# Patient Record
Sex: FEMALE | Race: WHITE | Employment: OTHER | URBAN - METROPOLITAN AREA
[De-identification: names, ages, dates, MRNs, and addresses within clinical notes are randomized per-mention and may not be internally consistent; named-entity substitution may affect disease eponyms.]

---

## 2018-05-26 ENCOUNTER — APPOINTMENT (OUTPATIENT)
Dept: GENERAL RADIOLOGY | Age: 63
End: 2018-05-26
Attending: EMERGENCY MEDICINE
Payer: COMMERCIAL

## 2018-05-26 ENCOUNTER — HOSPITAL ENCOUNTER (EMERGENCY)
Age: 63
Discharge: HOME | End: 2018-05-26
Attending: EMERGENCY MEDICINE | Admitting: EMERGENCY MEDICINE
Payer: COMMERCIAL

## 2018-05-26 VITALS
OXYGEN SATURATION: 97 % | SYSTOLIC BLOOD PRESSURE: 146 MMHG | TEMPERATURE: 97.7 F | HEIGHT: 63 IN | WEIGHT: 160.94 LBS | RESPIRATION RATE: 12 BRPM | HEART RATE: 72 BPM | DIASTOLIC BLOOD PRESSURE: 83 MMHG | BODY MASS INDEX: 28.52 KG/M2

## 2018-05-26 DIAGNOSIS — S51.031A PUNCTURE WOUND OF RIGHT ELBOW, INITIAL ENCOUNTER: Primary | ICD-10-CM

## 2018-05-26 DIAGNOSIS — L03.113 CELLULITIS OF RIGHT UPPER EXTREMITY: ICD-10-CM

## 2018-05-26 PROCEDURE — 73080 X-RAY EXAM OF ELBOW: CPT

## 2018-05-26 PROCEDURE — 99282 EMERGENCY DEPT VISIT SF MDM: CPT

## 2018-05-26 RX ORDER — DOXYCYCLINE HYCLATE 100 MG
100 TABLET ORAL 2 TIMES DAILY
Qty: 20 TAB | Refills: 0 | Status: SHIPPED | OUTPATIENT
Start: 2018-05-26 | End: 2018-06-05

## 2018-05-26 NOTE — DISCHARGE INSTRUCTIONS
Cellulitis: Care Instructions  Your Care Instructions    Cellulitis is a skin infection. It often occurs after a break in the skin from a scrape, cut, bite, or puncture, or after a rash. The doctor has checked you carefully, but problems can develop later. If you notice any problems or new symptoms, get medical treatment right away. Follow-up care is a key part of your treatment and safety. Be sure to make and go to all appointments, and call your doctor if you are having problems. It's also a good idea to know your test results and keep a list of the medicines you take. How can you care for yourself at home? · Take your antibiotics as directed. Do not stop taking them just because you feel better. You need to take the full course of antibiotics. · Prop up the infected area on pillows to reduce pain and swelling. Try to keep the area above the level of your heart as often as you can. · If your doctor told you how to care for your wound, follow your doctor's instructions. If you did not get instructions, follow this general advice:  ¨ Wash the wound with clean water 2 times a day. Don't use hydrogen peroxide or alcohol, which can slow healing. ¨ You may cover the wound with a thin layer of petroleum jelly, such as Vaseline, and a nonstick bandage. ¨ Apply more petroleum jelly and replace the bandage as needed. · Be safe with medicines. Take pain medicines exactly as directed. ¨ If the doctor gave you a prescription medicine for pain, take it as prescribed. ¨ If you are not taking a prescription pain medicine, ask your doctor if you can take an over-the-counter medicine. To prevent cellulitis in the future  · Try to prevent cuts, scrapes, or other injuries to your skin. Cellulitis most often occurs where there is a break in the skin. · If you get a scrape, cut, mild burn, or bite, wash the wound with clean water as soon as you can to help avoid infection.  Don't use hydrogen peroxide or alcohol, which can slow healing. · If you have swelling in your legs (edema), support stockings and good skin care may help prevent leg sores and cellulitis. · Take care of your feet, especially if you have diabetes or other conditions that increase the risk of infection. Wear shoes and socks. Do not go barefoot. If you have athlete's foot or other skin problems on your feet, talk to your doctor about how to treat them. When should you call for help? Call your doctor now or seek immediate medical care if:  ? · You have signs that your infection is getting worse, such as:  ¨ Increased pain, swelling, warmth, or redness. ¨ Red streaks leading from the area. ¨ Pus draining from the area. ¨ A fever. ? · You get a rash. ? Watch closely for changes in your health, and be sure to contact your doctor if:  ? · You are not getting better after 1 day (24 hours). ? · You do not get better as expected. Where can you learn more? Go to http://delgado-karlos.info/. Vivienne Balderrama in the search box to learn more about \"Cellulitis: Care Instructions. \"  Current as of: October 13, 2016  Content Version: 11.4  © 7865-5447 [employer]. Care instructions adapted under license by [employer] (which disclaims liability or warranty for this information). If you have questions about a medical condition or this instruction, always ask your healthcare professional. [place] [address] any warranty or liability for your use of this information. Puncture Wounds: Care Instructions  Your Care Instructions    A puncture wound can happen anywhere on your body. These wounds tend to be narrower and deeper than cuts. A puncture wound is usually left open instead of being closed. This is because a puncture wound can be easily infected, and closing it can make infection even more likely. You will probably have a bandage over the wound.   The doctor has checked you carefully, but problems can develop later. If you notice any problems or new symptoms, get medical treatment right away. Follow-up care is a key part of your treatment and safety. Be sure to make and go to all appointments, and call your doctor if you are having problems. It's also a good idea to know your test results and keep a list of the medicines you take. How can you care for yourself at home? · Keep the wound dry for the first 24 to 48 hours. After this, you can shower if your doctor okays it. Pat the wound dry. · Don't soak the wound, such as in a bathtub. Your doctor will tell you when it's safe to get the wound wet. · If your doctor told you how to care for your wound, follow your doctor's instructions. If you did not get instructions, follow this general advice:  ¨ After the first 24 to 48 hours, wash the wound with clean water 2 times a day. Don't use hydrogen peroxide or alcohol, which can slow healing. ¨ You may cover the wound with a thin layer of petroleum jelly, such as Vaseline, and a nonstick bandage. ¨ Apply more petroleum jelly and replace the bandage as needed. · Prop up the sore area on pillows anytime you sit or lie down during the next 3 days. Try to keep it above the level of your heart. This helps reduce swelling. · Avoid any activity that could cause your wound to get worse. · Be safe with medicines. Read and follow all instructions on the label. ¨ If the doctor gave you a prescription medicine for pain, take it as prescribed. ¨ If you are not taking a prescription pain medicine, ask your doctor if you can take an over-the-counter medicine. · If your doctor prescribed antibiotics, take them as directed. Do not stop taking them just because you feel better. You need to take the full course of antibiotics. When should you call for help? Call your doctor now or seek immediate medical care if:  ? · You have new pain, or your pain gets worse.    ? · The wound starts to bleed, and blood soaks through the bandage. Oozing small amounts of blood is normal.   ? · The skin near the wound is cold or pale or changes color. ? · You have tingling, weakness, or numbness near the wound. ? · You have trouble moving the area near the wound. ? · You have symptoms of infection, such as:  ¨ Increased pain, swelling, warmth, or redness around the wound. ¨ Red streaks leading from the wound. ¨ Pus draining from the wound. ¨ A fever. ? Watch closely for changes in your health, and be sure to contact your doctor if:  ? · The wound is not closing (getting smaller). ? · You do not get better as expected. Where can you learn more? Go to http://delgado-karlos.info/. Enter Y958 in the search box to learn more about \"Puncture Wounds: Care Instructions. \"  Current as of: March 20, 2017  Content Version: 11.4  © 7222-0251 [employer]. Care instructions adapted under license by [employer] (which disclaims liability or warranty for this information). If you have questions about a medical condition or this instruction, always ask your healthcare professional. [place] [address] any warranty or liability for your use of this information. WET TO DRY DRESSINGS EACH DAY    FOLLOW UP WITH [facility], I DISCUSSED WITH DR. Jake Richard WHO RECOMMENDED FOLLOW UP WITH DR. MICHAELS

## 2018-05-26 NOTE — ED PROVIDER NOTES
EMERGENCY DEPARTMENT HISTORY AND PHYSICAL EXAM      Date: 5/26/2018  Patient Name: Sirena Pulliam    History of Presenting Illness     Chief Complaint   Patient presents with    Elbow Pain     Ambulatory into the ED with c/o Rt elbow injury x 1 week-sent to the ED d/t abnormal xray; per pt, \"there is an air pocket. \" WBC is WNL. Denies fevers/chills. History Provided By: Patient    HPI: Sirena Pulliam, 60 y.o. female presents ambulatory to the ED from [facility] with cc of acute mild right elbow swelling after falling down her porch steps 1 week ago. Pt reports entering the ED after a friend told her the elbow looked infected. Pt endorses being able to flex and extend the arm without difficulty. She endorses being able to lift her grandchildren and denies any pain. She notes the imaging performed at [facility] indicated an air pocket at the right elbow. Pt denies a PMhx significant for DM. She reports having her tetanus updated today. Pt also reports mild left hip pain after the fall that was previously worked up without evidence of fracture or other complications. Pt denies any EtOH, or illegal drug use, however endorses tobacco use. Pt denies any fever, n/v/d, or other new symptoms at this time. Chief Complaint: Right elbow injury  Duration: 1 week   Timing:  Acute  Location: Right elbow  Quality: Swelling  Severity: mild  Modifying Factors: N/A  Associated Symptoms: denies any other associated signs or symptoms    There are no other complaints, changes, or physical findings at this time. PCP: Zac Harris MD    Past History     Past Medical History:  No past medical history on file. Past Surgical History:  No past surgical history on file. Family History:  No family history on file.     Social History:  Social History   Substance Use Topics    Smoking status: Not on file    Smokeless tobacco: Not on file    Alcohol use Not on file       Allergies:  No Known Allergies      Review of Systems   Review of Systems   Constitutional: Negative. Negative for appetite change, chills, fatigue and fever. HENT: Negative. Negative for congestion, rhinorrhea, sinus pressure and sore throat. Eyes: Negative. Respiratory: Negative. Negative for cough, choking, chest tightness, shortness of breath and wheezing. Cardiovascular: Negative. Negative for chest pain, palpitations and leg swelling. Gastrointestinal: Negative for abdominal pain, constipation, diarrhea, nausea and vomiting. Endocrine: Negative. Genitourinary: Negative. Negative for difficulty urinating, dysuria, flank pain and urgency. Musculoskeletal: Negative. (+) right elbow swelling   Skin: Negative. Neurological: Negative. Negative for dizziness, speech difficulty, weakness, light-headedness, numbness and headaches. Psychiatric/Behavioral: Negative. All other systems reviewed and are negative. Physical Exam   Physical Exam   Constitutional: She is oriented to person, place, and time. She appears well-developed and well-nourished. No distress. HENT:   Head: Normocephalic and atraumatic. Mouth/Throat: Oropharynx is clear and moist. No oropharyngeal exudate. Eyes: Conjunctivae and EOM are normal. Pupils are equal, round, and reactive to light. Neck: Normal range of motion. Neck supple. No JVD present. No tracheal deviation present. Cardiovascular: Normal rate, regular rhythm, normal heart sounds and intact distal pulses. No murmur heard. Pulmonary/Chest: Effort normal and breath sounds normal. No stridor. No respiratory distress. She has no wheezes. She has no rales. She exhibits no tenderness. Musculoskeletal: Normal range of motion. She exhibits no edema.    Nickel size open wound over the right olecranon, no crepitus appreciated, minimal serous drainage but no exudate, mild erythema and warmth extending in cricular pattern approx 1\" in width, Right shoulder, right elbow and hand with full range of motion without any pain    Neurological: She is alert and oriented to person, place, and time. No cranial nerve deficit. No gross motor or sensory deficits    Skin: Skin is warm and dry. She is not diaphoretic. Psychiatric: She has a normal mood and affect. Her behavior is normal.   Nursing note and vitals reviewed. Diagnostic Study Results     Radiologic Studies -   XR ELBOW RT MIN 3 V   Final Result   EXAM:  XR ELBOW RT MIN 3 V     INDICATION:   Elbow pain, puncture wound of olecranon.     COMPARISON: None.     FINDINGS: Three views of the right elbow demonstrate no fracture or  malalignment. No bony destruction. No effusion. Large soft tissue defect  overlying the olecranon     IMPRESSION  IMPRESSION:      Large soft tissue defect dorsally with no fracture or bone destruction. Medical Decision Making   I am the first provider for this patient. I reviewed the vital signs, available nursing notes, past medical history, past surgical history, family history and social history. Vital Signs-Reviewed the patient's vital signs. Patient Vitals for the past 12 hrs:   Temp Pulse Resp BP SpO2   05/26/18 1143 97.7 °F (36.5 °C) 72 12 146/83 97 %       Records Reviewed: Nursing Notes, Old Medical Records, Previous electrocardiograms, Ambulance Run Sheet, Previous Radiology Studies and Previous Laboratory Studies    Provider Notes (Medical Decision Making):   DDx: cellulitis, puncture wound, elbow fracture    ED Course:   11:57 AM  Initial assessment performed. The patients presenting problems have been discussed, and they are in agreement with the care plan formulated and outlined with them. I have encouraged them to ask questions as they arise throughout their visit. 12:47 PM  Ricky Texted orthopedics regarding elbow XR.     CONSULT NOTE:  1:02 PM  Afshan Quevedo DO spoke with Radha Serrano MD via [facility] Text  Specialty: Orthopedics  Discussed patient's hx, disposition, and available diagnostic and imaging results. Reviewed care plans. Consultant agrees with plans as outlined. Dr. Refugio Palmer recommends oral antibiotics with orthopedic follow up, and apply wet to dry dressing. Procedure Note - Wound cleanse:  1:13 PM  Procedure by [employer] Corporation[employer]NAE. Wound to left elbow. No expressible drainage. No surrounding erythema, edema or ecchymosis. Wound copiously irrigated under saline lavage with Hibiclens and saline solution. Wet dressing applied with gauze and ACE wrap overlying. Procedure took 10 minutes and patient tolerated the procedure well. Disposition:  DISCHARGE NOTE:  1:24 PM  The patient has been re-evaluated and is ready for discharge. Reviewed available results with patient. Counseled patient on diagnosis and care plan. Patient has expressed understanding, and all questions have been answered. Patient agrees with plan and agrees to follow up as recommended, or return to the ED if their symptoms worsen. Discharge instructions have been provided and explained to the patient, along with reasons to return to the ED. PLAN:  1. Current Discharge Medication List      START taking these medications    Details   doxycycline (VIBRA-TABS) 100 mg tablet Take 1 Tab by mouth two (2) times a day for 10 days. Qty: 20 Tab, Refills: 0           2. Follow-up Information     Follow up With Details Comments [address] [place] [address]  [facility] [address]  [address]  [phone]      [facility] EMERGENCY DEPT  If symptoms worsen [address] [facility] [address]   [address] [address] [phone]        Return to ED if worse     Diagnosis     Clinical Impression:   1. Puncture wound of right elbow, initial encounter    2.  Cellulitis of right upper extremity        Attestations:    ATTESTATION:  This note is prepared by Flaquita Tirado, acting as Scribe for Brielle Tillman Paul Rene DO: The scribe's documentation has been prepared under my direction and personally reviewed by me in its entirety. I confirm that the note above accurately reflects all work, treatment, procedures, and medical decision making performed by me.

## 2018-05-26 NOTE — ED NOTES
Discharge instructions reviewed with patient, copy given by Dr. Chelsea Harry. Pt is accomponied by family, denies use of wheelchair.

## 2018-05-26 NOTE — ED NOTES
Assumed care of patient. Patient is alert and oriented, does not appear to be in distress. Patient ambulatory to ED with c/o possible infection in right elbow wound.  Sent by [facility].

## 2019-07-08 RX ORDER — CITALOPRAM 20 MG/1
30 TABLET, FILM COATED ORAL DAILY
COMMUNITY

## 2019-07-08 RX ORDER — LORATADINE 10 MG/1
10 TABLET ORAL DAILY
COMMUNITY

## 2019-07-08 RX ORDER — BISMUTH SUBSALICYLATE 262 MG
1 TABLET,CHEWABLE ORAL DAILY
COMMUNITY

## 2019-07-08 RX ORDER — MELOXICAM 15 MG/1
15 TABLET ORAL DAILY
Status: ON HOLD | COMMUNITY
End: 2019-11-05

## 2019-07-08 NOTE — PERIOP NOTES
[facility]  Ambulatory Surgery Unit  Pre-operative Instructions    Procedure Date  7/16            Tentative Arrival Time 3:30pm      1. On the day of your procedure, please report to the Ambulatory Surgery Unit Registration Desk and sign in at your designated time. The Ambulatory Surgery Unit is located in [facility] on the [facility] side of the [facility] across from the [address] [place] [address]. Please have all of your health insurance cards and a photo ID. 2. You must have someone with you to drive you home as directed by your surgeon. 3. You may have a light breakfast and take normal morning medications. 4. We recommend you do not drink any alcoholic beverages for 24 hours before and after your procedure. 5. Contact your surgeons office for instructions on the following medications: non-steroidal anti-inflammatory drugs (i.e. Advil, Aleve), vitamins, and supplements. (Some surgeons will want you to stop these medications prior to surgery and others may allow you to take them)   **If you are currently taking Plavix, Coumadin, Aspirin and/or other blood-thinning agents, contact your surgeon for instructions. ** Your surgeon will partner with the physician prescribing these medications to determine if it is safe to stop or if you need to continue taking. Please do not stop taking these medications without instructions from your surgeon. 6. In an effort to help prevent surgical site infection, we ask that you shower with an anti-bacterial soap (i.e. Dial or Safeguard) on the morning of your procedure. Do not apply any lotions, powders, or deodorants after showering. 7. Wear comfortable clothes. Wear glasses instead of contacts. Do not bring any jewelry or money (other than copays or fees as instructed). Do not wear make-up, particularly mascara, the morning of your procedure. Wear your hair loose or down, no ponytails, buns, chelsea pins or clips.  All body piercings must be removed. 8. You should understand that if you do not follow these instructions your procedure may be cancelled. If your physical condition changes (i.e. fever, cold or flu) please contact your surgeon as soon as possible. 9. It is important that you be on time. If a situation occurs where you may be late, or if you have any questions or problems, please call [phone].    10. Your procedure time may be subject to change. You will receive a phone call the day prior to confirm your arrival time. I understand a pre-operative phone call will be made to verify my procedure time. In the event that I am not available, I give permission for a message to be left on my answering service and/or with another person?       yes    Reviewed by phone with pt, verbalized understanding     ___________________      ___________________      ___________________  (Signature of Patient)          (Witness)                   (Date and Time)

## 2019-07-16 ENCOUNTER — HOSPITAL ENCOUNTER (OUTPATIENT)
Age: 64
Setting detail: OUTPATIENT SURGERY
Discharge: HOME | End: 2019-07-16
Attending: PHYSICAL MEDICINE & REHABILITATION | Admitting: PHYSICAL MEDICINE & REHABILITATION
Payer: MEDICARE

## 2019-07-16 ENCOUNTER — APPOINTMENT (OUTPATIENT)
Dept: GENERAL RADIOLOGY | Age: 64
End: 2019-07-16
Attending: PHYSICAL MEDICINE & REHABILITATION
Payer: MEDICARE

## 2019-07-16 VITALS
WEIGHT: 148 LBS | BODY MASS INDEX: 27.23 KG/M2 | HEART RATE: 66 BPM | HEIGHT: 62 IN | RESPIRATION RATE: 16 BRPM | TEMPERATURE: 98 F | OXYGEN SATURATION: 99 % | DIASTOLIC BLOOD PRESSURE: 97 MMHG | SYSTOLIC BLOOD PRESSURE: 116 MMHG

## 2019-07-16 PROCEDURE — 76210000046 HC AMBSU PH II REC FIRST 0.5 HR: Performed by: PHYSICAL MEDICINE & REHABILITATION

## 2019-07-16 PROCEDURE — 74011000250 HC RX REV CODE- 250: Performed by: PHYSICAL MEDICINE & REHABILITATION

## 2019-07-16 PROCEDURE — 74011250636 HC RX REV CODE- 250/636: Performed by: PHYSICAL MEDICINE & REHABILITATION

## 2019-07-16 PROCEDURE — 77030003666 HC NDL SPINAL BD -A: Performed by: PHYSICAL MEDICINE & REHABILITATION

## 2019-07-16 PROCEDURE — 72020 X-RAY EXAM OF SPINE 1 VIEW: CPT

## 2019-07-16 PROCEDURE — 76030000002 HC AMB SURG OR TIME FIRST 0.: Performed by: PHYSICAL MEDICINE & REHABILITATION

## 2019-07-16 PROCEDURE — 76000 FLUOROSCOPY <1 HR PHYS/QHP: CPT

## 2019-07-16 RX ORDER — METHYLPREDNISOLONE ACETATE 40 MG/ML
40 INJECTION, SUSPENSION INTRA-ARTICULAR; INTRALESIONAL; INTRAMUSCULAR; SOFT TISSUE ONCE
Status: COMPLETED | OUTPATIENT
Start: 2019-07-16 | End: 2019-07-16

## 2019-07-16 RX ORDER — LIDOCAINE HYDROCHLORIDE 20 MG/ML
8 INJECTION, SOLUTION INFILTRATION; PERINEURAL ONCE
Status: COMPLETED | OUTPATIENT
Start: 2019-07-16 | End: 2019-07-16

## 2019-07-16 RX ORDER — BUPIVACAINE HYDROCHLORIDE 5 MG/ML
5 INJECTION, SOLUTION EPIDURAL; INTRACAUDAL ONCE
Status: COMPLETED | OUTPATIENT
Start: 2019-07-16 | End: 2019-07-16

## 2019-07-16 NOTE — H&P
Procedural Case Note    7/16/2019    (4:02 PM)    Olman Bolanos    1955   (71 y.o.)    [id number]    CC:  pain    ROS:   Complete ROS obtained, no CP, no SOB, no N or V    PMH:     Past Medical History:   Diagnosis Date    Anxiety     Arthritis     H/O seasonal allergies        ALLERGIES:   No Known Allergies    MEDS:     Current Facility-Administered Medications   Medication Dose Route Frequency    bupivacaine (PF) (MARCAINE) 0.5 % (5 mg/mL) injection 25 mg  5 mL Epidural ONCE    methylPREDNISolone acetate (DEPO-MEDROL) 40 mg/mL injection 40 mg  40 mg IntraMUSCular ONCE    lidocaine (XYLOCAINE) 20 mg/mL (2 %) injection 160 mg  8 mL IntraDERMal ONCE    iohexol (OMNIPAQUE) 180 mg iodine/mL injection 10 mL  10 mL Intra artICUlar ONCE    sodium bicarbonate (4%) (NEUT) injection 1 mL  1 mL SubCUTAneous ONCE          Visit Vitals  /65 (BP 1 Location: Right arm, BP Patient Position: At rest)   Pulse 73   Temp 98 °F (36.7 °C)   Resp 14   Ht 5' 2\" (1.575 m)   Wt 67.1 kg (148 lb)   SpO2 96%   BMI 27.07 kg/m²     PE:  Gen: NAD  Head: normocephalic  Heart: RRR  Lungs: CTA montse  Abd: NT, ND, soft  Neuro: awake and alert  Skin: intact    IMPRESSION:   Lumbar DJD    Note:  The clinical status was discussed in detail with the patient. The procedure was again discussed and described in detail. All understand and accept the planned procedure and risks; reject other forms of treatment. All questions are answered.     Arnulfo Michael MD

## 2019-07-16 NOTE — PERIOP NOTES
Michael Black  1955  [id number]    Situation:  Verbal report given from: ELSIE Roy RN  Procedure: Procedure(s):  BILATERAL L4-5 AND L5-S1 FACET INJECTIONS    Background:    Preoperative diagnosis: LUMBAR SPONDYLOSIS    Postoperative diagnosis: LUMBAR SPONDYLOSIS    :  Dr. Alan Europe:  Intra-procedure medications, procedure, and allergies reviewed        Recommendation:    Discharge patient home after discharge instructions reviewed with patient. Rest until local has worn off.

## 2019-07-16 NOTE — DISCHARGE INSTRUCTIONS
Discharge Instructions    Lumbar Facet Block/Medial Branch Block    You had a lumbar facet injection today. You will probably have some numbness in your lower back area for the next 6-8 hours. The steroids will slowly become effective, reducing your pain, over the next 2 weeks. You should begin feeling better after a few days, but it may take up to 2 weeks to notice the difference. The benefit you get from your injection will last a variable amount of time, depending on the severity of your spine problem. If the results you experience are significant, but not lasting a long time, you may be a candidate for a procedure that can be longer lasting (radiofrequency ablation of the nerves innervating the facet joints).  Pain:  Most people do not have any increase in pain after this injection. However, you might experience some soreness at the site of the injection. If this happens, putting an icepack over the sore area will help.  Bandage: You have a small bandage covering the site of the injection. You may remove it when you get home.  Restrictions:  Someone should drive you home after the injection. After that, you have no restrictions. You may resume your normal level of activity. You may take a shower or bath, and you may eat normally. You should continue your current exercised and/or therapy routine.  Medications:  Continue your current medications as prescribed. If your pain decreases, you may reduce the amount of your pain medicines. If you stopped taking anticoagulants or blood-thinners before the injection, start them tomorrow. If you have diabetes, your blood sugar may be elevated for a few days. Call your primary doctor with any questions.     Call Dr. Amilcar Rodriguez at [phone] if you experience:   Fever (101 degrees Fahrenheit or greater)   Nausea or vomiting   Headache unrelieved by your normal pain medicine   Redness or swelling at the injection site that lasts more than 1 day   New numbness, tingling, weakness, or pain that you didnt have before the injection     If still having pain in 1-2 weeks, call office at [phone] for a follow up appointment. DISCHARGE SUMMARY from Nurse    The following personal items collected during your admission are returned to you:   Dental Appliance: Dental Appliances: None  Vision: Visual Aid: None  Hearing Aid:    Jewelry: Jewelry: None  Clothing: Clothing: With patient  Other Valuables:    Valuables sent to safe: If you were given prescriptions, please review the written information on prescribed medications. · You will receive a Post Operative Call from one of the Recovery Room Nurses on the day after your surgery to check on you. It is very important for us to know how you are recovering after your surgery. · You may receive an e-mail or letter in the mail from [employer] regarding your experience with us in the Ambulatory Surgery Unit. Your feedback is valuable to us and we appreciate your participation in the survey. If you have not had your influenza or pneumococcal vaccines, please follow up with your primary care physician. The discharge information has been reviewed with the patient. The patient verbalized understanding.

## 2019-07-16 NOTE — OP NOTES
Facet Steroid Injection Operative Report    Indications: This is a 71 y.o. female who presents with LBP. She was positive for LS DJD. The patient was admitted for surgery as conservative measures have failed. Date of Surgery: 7/16/2019    Preoperative Diagnosis: LS DJD    Postoperative Diagnosis: LS DJD    Surgeon(s) and Role:     * Tera Meckel, MD - Primary     Procedure:  Procedure(s):  BILATERAL L4-5 AND L5-S1 FACET INJECTIONS    Procedure in Detail:  After appropriate informed consent was obtained, the patient was taken to the operating suite and placed in the prone position on the operating table on appropriate padding. The LS region was prepped and draped in the usual sterile fashion. Intraoperative fluoroscopy was used to localize the LS spine. The skin was infiltrated with 2% lidocaine. An 22-g needle was advanced into the Paco L4-5 and L5-S1 facets under fluoroscopic guidance. Next, 2ml of 0.5% marcaine and 80mg of Depo-Medrol were injected. The needle was removed from the patient. The patient was then turned back into the supine position on the stretcher and was taken to the Recovery Room in stable condition.     Estimated Blood Loss:  none     Specimens: None       Drains: None          Complications:  None    Signed By: Daphne Tobin MD                        July 16, 2019

## 2019-09-05 ENCOUNTER — HOSPITAL ENCOUNTER (OUTPATIENT)
Dept: MRI IMAGING | Age: 64
Discharge: HOME | End: 2019-09-05
Attending: ORTHOPAEDIC SURGERY
Payer: MEDICARE

## 2019-09-05 DIAGNOSIS — M54.5 LOW BACK PAIN, UNSPECIFIED BACK PAIN LATERALITY, UNSPECIFIED CHRONICITY, WITH SCIATICA PRESENCE UNSPECIFIED: ICD-10-CM

## 2019-09-05 DIAGNOSIS — M47.816 LUMBAR SPONDYLOSIS: ICD-10-CM

## 2019-09-05 PROCEDURE — 72148 MRI LUMBAR SPINE W/O DYE: CPT

## 2019-11-04 RX ORDER — DICLOFENAC SODIUM 75 MG/1
75 TABLET, DELAYED RELEASE ORAL 2 TIMES DAILY
COMMUNITY

## 2019-11-04 RX ORDER — GABAPENTIN 300 MG/1
300 CAPSULE ORAL 3 TIMES DAILY
COMMUNITY

## 2019-11-05 ENCOUNTER — APPOINTMENT (OUTPATIENT)
Dept: GENERAL RADIOLOGY | Age: 64
End: 2019-11-05
Attending: PHYSICAL MEDICINE & REHABILITATION
Payer: MEDICARE

## 2019-11-05 ENCOUNTER — HOSPITAL ENCOUNTER (OUTPATIENT)
Age: 64
Setting detail: OUTPATIENT SURGERY
Discharge: HOME | End: 2019-11-05
Attending: PHYSICAL MEDICINE & REHABILITATION | Admitting: PHYSICAL MEDICINE & REHABILITATION
Payer: MEDICARE

## 2019-11-05 VITALS
HEART RATE: 74 BPM | BODY MASS INDEX: 28.71 KG/M2 | HEIGHT: 62 IN | DIASTOLIC BLOOD PRESSURE: 77 MMHG | WEIGHT: 156 LBS | RESPIRATION RATE: 18 BRPM | OXYGEN SATURATION: 96 % | SYSTOLIC BLOOD PRESSURE: 134 MMHG | TEMPERATURE: 98.1 F

## 2019-11-05 PROCEDURE — 77030003666 HC NDL SPINAL BD -A: Performed by: PHYSICAL MEDICINE & REHABILITATION

## 2019-11-05 PROCEDURE — 74011636320 HC RX REV CODE- 636/320: Performed by: PHYSICAL MEDICINE & REHABILITATION

## 2019-11-05 PROCEDURE — 76000 FLUOROSCOPY <1 HR PHYS/QHP: CPT

## 2019-11-05 PROCEDURE — 74011000250 HC RX REV CODE- 250: Performed by: PHYSICAL MEDICINE & REHABILITATION

## 2019-11-05 PROCEDURE — 76210000046 HC AMBSU PH II REC FIRST 0.5 HR: Performed by: PHYSICAL MEDICINE & REHABILITATION

## 2019-11-05 PROCEDURE — 74011250636 HC RX REV CODE- 250/636: Performed by: PHYSICAL MEDICINE & REHABILITATION

## 2019-11-05 PROCEDURE — 76030000002 HC AMB SURG OR TIME FIRST 0.: Performed by: PHYSICAL MEDICINE & REHABILITATION

## 2019-11-05 PROCEDURE — 72100 X-RAY EXAM L-S SPINE 2/3 VWS: CPT

## 2019-11-05 RX ORDER — BUPIVACAINE HYDROCHLORIDE 5 MG/ML
10 INJECTION, SOLUTION EPIDURAL; INTRACAUDAL ONCE
Status: DISCONTINUED | OUTPATIENT
Start: 2019-11-05 | End: 2019-11-05 | Stop reason: HOSPADM

## 2019-11-05 RX ORDER — LIDOCAINE HYDROCHLORIDE 20 MG/ML
7 INJECTION, SOLUTION INFILTRATION; PERINEURAL ONCE
Status: COMPLETED | OUTPATIENT
Start: 2019-11-05 | End: 2019-11-05

## 2019-11-05 RX ORDER — METHYLPREDNISOLONE ACETATE 40 MG/ML
40 INJECTION, SUSPENSION INTRA-ARTICULAR; INTRALESIONAL; INTRAMUSCULAR; SOFT TISSUE ONCE
Status: COMPLETED | OUTPATIENT
Start: 2019-11-05 | End: 2019-11-05

## 2019-11-05 RX ORDER — LORAZEPAM 1 MG/1
1 TABLET ORAL
COMMUNITY

## 2019-11-05 NOTE — PERIOP NOTES
Skin assessment:   WNL   Skin color: normal for etnicity   Skin condition: intact   Skin integrity: WNL- pt denies any cuts or sores   Turgor: normal for age    Neuro:  Push/Pull assessment:     LUE Response: strong    LLE Response: strong push/ pull   RUE Response: strong    RLE Response: strong push / pull    OB/Gyn assessment:    LMP: menopausal     If no menstrual period then reason: menopausal

## 2019-11-05 NOTE — H&P
Procedural Case Note    11/5/2019    (1:17 PM)    Kristy Lu    1955   (71 y.o.)    [id number]    CC:  pain    ROS:   Complete ROS obtained, no CP, no SOB, no N or V    PMH:     Past Medical History:   Diagnosis Date    Anxiety     Arthritis     H/O seasonal allergies        ALLERGIES:   No Known Allergies    MEDS:     Current Facility-Administered Medications   Medication Dose Route Frequency    methylPREDNISolone acetate (DEPO-MEDROL) 40 mg/mL injection 40 mg  40 mg IntraMUSCular ONCE    bupivacaine (PF) (MARCAINE) 0.5 % (5 mg/mL) injection 50 mg  10 mL Epidural ONCE    lidocaine (XYLOCAINE) 20 mg/mL (2 %) injection 140 mg  7 mL SubCUTAneous ONCE    iohexol (OMNIPAQUE) 180 mg iodine/mL injection 10 mL  10 mL Intra artICUlar ONCE    sodium bicarbonate (4%) (NEUT) injection 1 mL  1 mL SubCUTAneous ONCE          Visit Vitals  /71 (BP 1 Location: Right arm, BP Patient Position: At rest)   Pulse 68   Temp 98 °F (36.7 °C)   Resp 18   Ht 5' 2\" (1.575 m)   Wt 70.8 kg (156 lb)   SpO2 96%   BMI 28.53 kg/m²     PE:  Gen: NAD  Head: normocephalic  Heart: RRR  Lungs: CTA montse  Abd: NT, ND, soft  Neuro: awake and alert  Skin: intact    IMPRESSION:   LS DDD    Note:  The clinical status was discussed in detail with the patient. The procedure was again discussed and described in detail. All understand and accept the planned procedure and risks; reject other forms of treatment. All questions are answered.     Luciana Barry MD

## 2019-11-05 NOTE — PERIOP NOTES
Pt has strong and equal plantar and dorsi flexion upon assessment    1422-Pt able to stand and hold weight, R leg weaker upon standing. No swelling or bleeding at injection sites. Transported via w/c to awaiting transportation.

## 2019-11-05 NOTE — OP NOTES
Epidural Steroid Injection Operative Report    Indications: This is a 71 y.o. female who presents with low back pain. She was positive for LS DDD. The patient was admitted for surgery as conservative measures have failed. Date of Surgery: 11/5/2019    Preoperative Diagnosis: LS DDD    Postoperative Diagnosis: LS DDD    Surgeon(s) and Role:     * Anthony Magana MD - Primary     Procedure:  Right L4 and L5 TFESI    Procedure in Detail:  After appropriate informed consent was obtained, the patient was taken to the operating suite and placed in the prone position on the operating table on appropriate padding. The LS region was prepped and draped in the usual sterile fashion. Intraoperative fluoroscopy was used to localize the LS spine. The skin was infiltrated with 2% lidocaine. An 22-g needle was advanced into the Right L4 and L5 neuroforamen under fluoroscopic guidance. A small amount of contrast was injected into the epidural space, confirming appropriate needle placement on fluoroscopy. Next, 2ml of 2% lidocaine and 80mg of Depo-Medrol were injected. The needle was removed from the patient. The patient was then turned back into the supine position on the stretcher and was taken to the Recovery Room in stable condition.     Estimated Blood Loss:  none     Specimens: None       Drains: None          Complications:  None    Signed By: Sherie Singh MD                        November 5, 2019

## 2019-11-05 NOTE — PERIOP NOTES
[facility]  1955  [id number]    Situation:  Verbal report given from: ELSIE Roy RN  Procedure: Procedure(s):  RIGHT AND LEFT L4 AND L5 TFESI    Background:    Preoperative diagnosis: DDD (degenerative disc disease), lumbar [M51.36]    Postoperative diagnosis: DDD (degenerative disc disease), lumbar [M51.36]    :  Dr. Zaman Solid:  Intra-procedure medications, procedure, and allergies reviewed        Recommendation:    Discharge patient home after discharge instructions reviewed with patient. Rest until local has worn off.

## 2019-11-05 NOTE — DISCHARGE INSTRUCTIONS
Dr. Ashley Brown Discharge Instructions  Transforaminal Epidural Steroid Injection/ Selective Nerve Block    You had a transforaminal epidural steroid injection/ selective nerve block today. You will probably have some numbness, and possibly weakness, in your leg for the next 6 to 8 hours. The steroids will slowly become effective, reducing your pain, over the next 2 weeks. You should begin feeling better after a few days, but it may take up to 2 weeks to notice the difference. The benefit you get from your injection will last a variable amount of time, depending on the severity of your lumbar spine problem.  Pain: Most people do not have any increase in pain after this injection. However, you might experience some soreness in your low back. If this happens, putting an ice pack over the sore area will help.  Bandage: You will have a small bandage covering the site of the injection. You may remove it once you get home.  Restrictions: Someone should drive you home after the injection. After that, you have no restrictions. You need to be careful while walking, as you may still have some numbness or weakness in your leg. You may resume your normal level of activity. You may take a shower or bath, and you may eat normally. You should continue your current exercises and/or therapy routine.   Medications: Continue your current medications as prescribed. If your pain decreases, you may reduce the amount of your pain medicines. If you stopped taking anticoagulants or blood-thinners before the injection, start them tomorrow. If you have diabetes, your blood sugar may be elevated for a few days. Call your primary doctor with any questions.   Call Dr. Ashley Brown at [phone] if you experience:   Fever (101 degrees Fahrenheit or greater)   Nausea or vomiting   Headache unrelieved by your normal pain medicine   Redness or swelling at the injection site that lasts more than 1 day   New numbness, tingling, weakness, or pain that you didnt have before the injection    Follow-up appointment:   If still having pain in 1-2 weeks, call office at [phone] for a follow up appointment. DISCHARGE SUMMARY from Nurse    The following personal items collected during your admission are returned to you:   Dental Appliance:    Vision:    Hearing Aid:    Jewelry:    Clothing:    Other Valuables:    Valuables sent to safe: If you were given prescriptions, please review the written information on prescribed medications. · You will receive a Post Operative Call from one of the Recovery Room Nurses on the day after your surgery to check on you. It is very important for us to know how you are recovering after your surgery. · You may receive an e-mail or letter in the mail from [facility] regarding your experience with us in the Ambulatory Surgery Unit. Your feedback is valuable to us and we appreciate your participation in the survey. If you have not had your influenza or pneumococcal vaccines, please follow up with your primary care physician. The discharge information has been reviewed with the patient. The patient verbalized understanding.

## 2020-02-04 ENCOUNTER — HOSPITAL ENCOUNTER (OUTPATIENT)
Dept: MRI IMAGING | Age: 65
Discharge: HOME | End: 2020-02-04
Attending: PHYSICAL MEDICINE & REHABILITATION
Payer: MEDICARE

## 2020-02-04 DIAGNOSIS — M50.30 DDD (DEGENERATIVE DISC DISEASE), CERVICAL: ICD-10-CM

## 2020-02-04 PROCEDURE — 72141 MRI NECK SPINE W/O DYE: CPT

## 2020-02-25 ENCOUNTER — APPOINTMENT (OUTPATIENT)
Dept: GENERAL RADIOLOGY | Age: 65
End: 2020-02-25
Attending: PHYSICAL MEDICINE & REHABILITATION
Payer: MEDICARE

## 2020-02-25 ENCOUNTER — HOSPITAL ENCOUNTER (OUTPATIENT)
Age: 65
Setting detail: OUTPATIENT SURGERY
Discharge: HOME | End: 2020-02-25
Attending: PHYSICAL MEDICINE & REHABILITATION | Admitting: PHYSICAL MEDICINE & REHABILITATION
Payer: MEDICARE

## 2020-02-25 VITALS
OXYGEN SATURATION: 99 % | SYSTOLIC BLOOD PRESSURE: 134 MMHG | DIASTOLIC BLOOD PRESSURE: 84 MMHG | WEIGHT: 153 LBS | BODY MASS INDEX: 27.11 KG/M2 | HEIGHT: 63 IN | TEMPERATURE: 98.4 F | HEART RATE: 66 BPM | RESPIRATION RATE: 18 BRPM

## 2020-02-25 PROCEDURE — 77030003665 HC NDL SPN BBMI -A: Performed by: PHYSICAL MEDICINE & REHABILITATION

## 2020-02-25 PROCEDURE — 72020 X-RAY EXAM OF SPINE 1 VIEW: CPT

## 2020-02-25 PROCEDURE — 74011000250 HC RX REV CODE- 250: Performed by: PHYSICAL MEDICINE & REHABILITATION

## 2020-02-25 PROCEDURE — 74011250636 HC RX REV CODE- 250/636: Performed by: PHYSICAL MEDICINE & REHABILITATION

## 2020-02-25 PROCEDURE — 76000 FLUOROSCOPY <1 HR PHYS/QHP: CPT

## 2020-02-25 PROCEDURE — 76210000046 HC AMBSU PH II REC FIRST 0.5 HR: Performed by: PHYSICAL MEDICINE & REHABILITATION

## 2020-02-25 PROCEDURE — 76030000002 HC AMB SURG OR TIME FIRST 0.: Performed by: PHYSICAL MEDICINE & REHABILITATION

## 2020-02-25 RX ORDER — LIDOCAINE HYDROCHLORIDE 20 MG/ML
5 INJECTION, SOLUTION INFILTRATION; PERINEURAL ONCE
Status: COMPLETED | OUTPATIENT
Start: 2020-02-25 | End: 2020-02-25

## 2020-02-25 RX ORDER — BUPIVACAINE HYDROCHLORIDE 5 MG/ML
5 INJECTION, SOLUTION EPIDURAL; INTRACAUDAL ONCE
Status: COMPLETED | OUTPATIENT
Start: 2020-02-25 | End: 2020-02-25

## 2020-02-25 RX ORDER — DEXAMETHASONE SODIUM PHOSPHATE 4 MG/ML
8 INJECTION, SOLUTION INTRA-ARTICULAR; INTRALESIONAL; INTRAMUSCULAR; INTRAVENOUS; SOFT TISSUE ONCE
Status: COMPLETED | OUTPATIENT
Start: 2020-02-25 | End: 2020-02-25

## 2020-02-25 RX ORDER — METHYLPREDNISOLONE ACETATE 40 MG/ML
40 INJECTION, SUSPENSION INTRA-ARTICULAR; INTRALESIONAL; INTRAMUSCULAR; SOFT TISSUE ONCE
Status: DISCONTINUED | OUTPATIENT
Start: 2020-02-25 | End: 2020-02-25 | Stop reason: HOSPADM

## 2020-02-25 NOTE — H&P
Procedural Case Note    2/25/2020    (1:30 PM)    Tori Perez    1955   (71 y.o.)    [id number]    CC:  pain    ROS:   Complete ROS obtained, no CP, no SOB, no N or V    PMH:     Past Medical History:   Diagnosis Date    Anxiety     Arthritis     H/O seasonal allergies        ALLERGIES:   No Known Allergies    MEDS:     No current facility-administered medications for this encounter. There were no vitals taken for this visit. PE:  Gen: NAD  Head: normocephalic  Heart: RRR  Lungs: CTA montse  Abd: NT, ND, soft  Neuro: awake and alert  Skin: intact    IMPRESSION:   Cervical DJD    Note:  The clinical status was discussed in detail with the patient. The procedure was again discussed and described in detail. All understand and accept the planned procedure and risks; reject other forms of treatment. All questions are answered.     Annie Parkinson MD

## 2020-02-25 NOTE — DISCHARGE INSTRUCTIONS
Discharge Instructions  Cervical Medial Branch Block  You had a cervical facet injection today. You will probably have some numbness in your neck and/or arms for the next 6 to 8 hours. The steroids will slowly become effective, reducing your pain, over the next 2 weeks. You should begin feeling better after a few days, but it may take up to 2 weeks to notice the difference. The benefit you get from your injection will last a variable amount of time, depending on the severity of your cervical spine problem. If the results you experience are significant, but not long lasting, you may be a candidate for a procedure that can be longer lasting (radiofrequency ablation of the nerves innervating the facet joints).  Pain:  Most people do not have any increase in pain after this injection. However, you might experience some soreness at the site of the injection. If this happens, putting an ice pack over the sore area will help.  Bandage: You have a small bandage covering the site of the injection. You may remove it once you get home.  Restrictions:  Someone should drive you home after the injection. After that, you have no restrictions. You may resume your normal level of activity. You may take a shower or bath, and you may eat normally. You should continue your current exercises and/or therapy routine.  Medications:  Continue your current medications as prescribed. If your pain decreases, you may reduce the amount of your pain medicines. If you stopped taking anticoagulants or blood-thinners before the injection, start them tomorrow. If you have diabetes, your blood sugar may be elevated for a few days. Call your primary doctor with any questions.   Call Dr. Curt Gallardo at [phone]  if you experience:   Fever (101 degrees Fahrenheit or greater)   Nausea or vomiting   Headache unrelieved by your normal pain medicine   Redness or swelling at the injection site that lasts more than 1 day   New numbness, tingling, weakness, or pain that you didnt have before the injection. If still having pain in 1-2 weeks, call office at [phone] for a follow up appointment. DISCHARGE SUMMARY from Nurse    The following personal items collected during your admission are returned to you:   Dental Appliance: Dental Appliances: None  Vision:    Hearing Aid:    Jewelry: Jewelry: None  Clothing: Clothing: Shirt, With patient(bra and shirt under stretcher)  Other Valuables: Other Valuables: None  Valuables sent to safe: If you were given prescriptions, please review the written information on prescribed medications. · You will receive a Post Operative Call from one of the Recovery Room Nurses on the day after your surgery to check on you. It is very important for us to know how you are recovering after your surgery. · You may receive an e-mail or letter in the mail from [employer] regarding your experience with us in the Ambulatory Surgery Unit. Your feedback is valuable to us and we appreciate your participation in the survey. If you have not had your influenza or pneumococcal vaccines, please follow up with your primary care physician. The discharge information has been reviewed with the patient. The patient verbalized understanding.

## 2020-02-25 NOTE — PERIOP NOTES
Patient with strong bilateral hand . Denies any pain or discomfort. Injection site without drainage. Pt discharged via wheelchair, accompanied by RN. Pt discharged awake and alert, respirations equal and unlabored, skin warm, dry, and intact. Pt and family members' questions and concerns addressed prior to discharge.

## 2020-02-25 NOTE — OP NOTES
Cervical Facet Steroid Injection Operative Report    Indications: This is a 71 y.o. female who presents with cervicalgia. She was positive for cervical DJD. The patient was admitted for surgery as conservative measures have failed. Date of Surgery: 2/25/2020    Preoperative Diagnosis: cervical DJD    Postoperative Diagnosis: cervical DJD    Surgeon(s) and Role:     * Christoph Hoffman MD - Primary     Procedure:  Procedure(s):  BILATERAL C4-5, C5-6 FACET INJECTIONS    Procedure in Detail:  After appropriate informed consent was obtained, the patient was taken to the operating suite and placed in the prone position on the operating table on appropriate padding. The cervical region was prepped and draped in the usual sterile fashion. Intraoperative fluoroscopy was used to localize the cervical spine. The skin was infiltrated with 2% lidocaine. An 25-g needle was advanced into the Paco C4-5 and C5-6 facets under fluoroscopic guidance. Next, 2ml of 0.5% marcaine and 8 mg of Dexamethasone were injected. The needle was removed from the patient. The patient was then turned back into the supine position on the stretcher and was taken to the Recovery Room in stable condition.     Estimated Blood Loss:  none     Specimens: None       Drains: None          Complications:  None    Signed By: Dayanara White MD                        February 25, 2020

## 2020-02-25 NOTE — PERIOP NOTES
Rhianna Charles  1955  [id number]    Situation:  Verbal report given from: florencio Zaldivar RN    Procedure: Procedure(s):  BILATERAL C4-5, C5-6 FACET INJECTIONS    Background:    Preoperative diagnosis: DDD (degenerative disc disease), lumbar [M51.36]    Postoperative diagnosis: DDD (degenerative disc disease), lumbar [M51.36]    :  Dr. Siobhan Rand:  Intra-procedure medications, procedure, and allergies reviewed        Recommendation:    Discharge patient home after discharge instructions reviewed with patient. Rest until local has worn off.

## 2020-02-25 NOTE — PERIOP NOTES
Permission received to review discharge instructions and discuss private health information with daughter Kari Vázquez

## 2020-03-12 NOTE — PERIOP NOTES
[facility]  Ambulatory Surgery Unit  Pre-operative Instructions    Procedure Date  03/18            Tentative Arrival Time 1015      1. On the day of your procedure, please report to the Ambulatory Surgery Unit Registration Desk and sign in at your designated time. The Ambulatory Surgery Unit is located in [facility] on the [facility] side of the [facility] across from the [facility]. Please have all of your health insurance cards and a photo ID. 2. You must have someone with you to drive you home as directed by your surgeon. 3. You may have a light breakfast and take normal morning medications. 4. We recommend you do not drink any alcoholic beverages for 24 hours before and after your procedure. 5. Contact your surgeons office for instructions on the following medications: non-steroidal anti-inflammatory drugs (i.e. Advil, Aleve), vitamins, and supplements. (Some surgeons will want you to stop these medications prior to surgery and others may allow you to take them)   **If you are currently taking Plavix, Coumadin, Aspirin and/or other blood-thinning agents, contact your surgeon for instructions. ** Your surgeon will partner with the physician prescribing these medications to determine if it is safe to stop or if you need to continue taking. Please do not stop taking these medications without instructions from your surgeon. 6. In an effort to help prevent surgical site infection, we ask that you shower with an anti-bacterial soap (i.e. Dial or Safeguard) on the morning of your procedure. Do not apply any lotions, powders, or deodorants after showering. 7. Wear comfortable clothes. Wear glasses instead of contacts. Do not bring any jewelry or money (other than copays or fees as instructed). Do not wear make-up, particularly mascara, the morning of your procedure. Wear your hair loose or down, no ponytails, buns, chelsea pins or clips.  All body piercings must be removed. 8. You should understand that if you do not follow these instructions your procedure may be cancelled. If your physical condition changes (i.e. fever, cold or flu) please contact your surgeon as soon as possible. 9. It is important that you be on time. If a situation occurs where you may be late, or if you have any questions or problems, please call [phone].    10. Your procedure time may be subject to change. You will receive a phone call the day prior to confirm your arrival time. As a precaution [facility] has implemented a restricted visitation policy to limit to 1 visitor per patient. No visitors under the age of 15 will be permitted in the Hospital. If you are ill, please call to reschedule your procedure. (For pediatric patients, 2 visitors per patient). I understand a pre-operative phone call will be made to verify my procedure time. In the event that I am not available, I give permission for a message to be left on my answering service and/or with another person?       yes         ___________________      ___________________      ___________________  (Signature of Patient)          (Witness)                   (Date and Time)

## 2020-03-16 NOTE — H&P
CC:  Skin issue lesion right foot       History of present illness: Patient had a nonpigmented viable lesion approximately 7 mm between the first and second digits greater than 10 years but mostly most recently over last few months just won't close. No erythema, no purulent discharge, no pigmentation. 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 Again no erythema, no signs of infection, no indurated tissue, no clinical signs of malignancy seen right foot. We are recommending taking a wider opening flap in order to close this area using underlying Vicryl stitch and closed with a nylon stitch. The risks and possible complications, including infection, and failure to close the gap, were discussed as possible risks and complications. She understands and is fully agreeable to the procedure. Medications: Claritin, citalopram, biotin, collagen, hydr (bovine) (bulk), diclofenac sodium, multivitamin, gabapentin, mupirocin  Allergies: Patient has no known allergies. Medical History: Acute arthritis (disorder) , History of multiple allergies (situation)       Surgical History: Appendectomy, Orthopedic, Unkn, Web Toes. Family History: Natural Father  - Bilateral cataracts (disorder) , Blood clot (morphologic abnormality) , Acute arthritis (disorder) ,Natural Mother  - Ischemic stroke (disorder) , Heart disease (disorder)        Social History: Alcohol -Drinking described as Occasionally. Professional -Retired. Smoker -Current Smoker, 1 pack per day. General/constitutional: No malaise, no transient myalgia or arthralgia, patient afebrile. No loss of strength, no recent weight loss or weight gain unexplained. HEENT: No headaches, dizziness. No vision loss as of late, no neck stiffness, no swelling along the lymphatic channels.     Cardiovascular: No chest pain, no dyspnea on exertion, no palpitations. Respiratory: No wheezing, no shortness of breath, no unexplained fever or night sweats. GI: No abdominal pain, nausea, no vomiting. : No oliguria, polyuria or hematuria. Orthopedic: No lower back pain or stiffness, no radiation from the hip. No increase or new upper extremity weakness. ROS: No malaise, no shortness of breath, no posterior calf pain noted bilateral      Lower extremity examination:      Integument: Warm, supple, normal turgor. No pigmented lesion seen. No significant dryness distal digits bilaterally. As stated above right foot. Vascular: Palpable pedal pulses bilaterally, no loss of hair growth noted to the distal digits bilaterally. Capillary refill time less than 3 seconds to digits 1-5 bilaterally. No swelling or induration along the lymphatic channels. Neurological: Grossly intact bilaterally    Musculoskeletal: 5/5 muscle strength of all muscle groups of the foot and ankle bilaterally. Calf girth within normal limits and symmetrical.     Assessment: Non-closing noninfected appearing wound right foot. Treatment plan: As stated above, patient is failing basic conservative and clinical treatments, were opting for wide excisional approach with flap creation using Vicryl internally at the subcuticular level and external nylon to close this area. The risks and possible complications were discussed with the patient thoroughly. She is fully agreeable to the surgical procedure.       Winthrop Mortimer, DPM, [address] 2054  Board Certified, American Board of Foot and [id number]

## 2020-03-18 ENCOUNTER — HOSPITAL ENCOUNTER (OUTPATIENT)
Age: 65
Setting detail: OUTPATIENT SURGERY
Discharge: HOME | End: 2020-03-18
Attending: PODIATRIST | Admitting: PODIATRIST
Payer: MEDICARE

## 2020-03-18 VITALS
TEMPERATURE: 97.8 F | BODY MASS INDEX: 27.46 KG/M2 | SYSTOLIC BLOOD PRESSURE: 131 MMHG | OXYGEN SATURATION: 95 % | RESPIRATION RATE: 17 BRPM | HEIGHT: 63 IN | WEIGHT: 155 LBS | HEART RATE: 75 BPM | DIASTOLIC BLOOD PRESSURE: 70 MMHG

## 2020-03-18 PROCEDURE — 74011250637 HC RX REV CODE- 250/637: Performed by: PODIATRIST

## 2020-03-18 PROCEDURE — 77030031139 HC SUT VCRL2 J&J -A: Performed by: PODIATRIST

## 2020-03-18 PROCEDURE — 74011250636 HC RX REV CODE- 250/636: Performed by: PODIATRIST

## 2020-03-18 PROCEDURE — 77030018836 HC SOL IRR NACL ICUM -A: Performed by: PODIATRIST

## 2020-03-18 PROCEDURE — 77030002916 HC SUT ETHLN J&J -A: Performed by: PODIATRIST

## 2020-03-18 PROCEDURE — 76030000000 HC AMB SURG OR TIME 0.5 TO 1: Performed by: PODIATRIST

## 2020-03-18 PROCEDURE — 76210000046 HC AMBSU PH II REC FIRST 0.5 HR: Performed by: PODIATRIST

## 2020-03-18 PROCEDURE — 74011000250 HC RX REV CODE- 250: Performed by: PODIATRIST

## 2020-03-18 RX ORDER — WATER FOR INJECTION,STERILE
VIAL (ML) INJECTION
Status: DISCONTINUED
Start: 2020-03-18 | End: 2020-03-18 | Stop reason: HOSPADM

## 2020-03-18 RX ORDER — ACETAMINOPHEN 325 MG/1
650 TABLET ORAL
Status: CANCELLED | OUTPATIENT
Start: 2020-03-18

## 2020-03-18 RX ORDER — BUPIVACAINE HYDROCHLORIDE AND EPINEPHRINE 5; 5 MG/ML; UG/ML
INJECTION, SOLUTION EPIDURAL; INTRACAUDAL; PERINEURAL AS NEEDED
Status: DISCONTINUED | OUTPATIENT
Start: 2020-03-18 | End: 2020-03-18 | Stop reason: HOSPADM

## 2020-03-18 RX ORDER — SODIUM CHLORIDE 9 MG/ML
INJECTION, SOLUTION INTRAVENOUS
Status: COMPLETED | OUTPATIENT
Start: 2020-03-18 | End: 2020-03-18

## 2020-03-18 RX ORDER — CEFAZOLIN SODIUM 1 G/3ML
INJECTION, POWDER, FOR SOLUTION INTRAMUSCULAR; INTRAVENOUS
Status: DISCONTINUED
Start: 2020-03-18 | End: 2020-03-18 | Stop reason: HOSPADM

## 2020-03-18 RX ADMIN — WATER 2 G: 1 INJECTION INTRAMUSCULAR; INTRAVENOUS; SUBCUTANEOUS at 13:00

## 2020-03-18 RX ADMIN — Medication 3 AMPULE: at 12:21

## 2020-03-18 NOTE — H&P
NO CHANGES:        CC:      Skin issue lesion right foot                   History of present illness: Patient had a nonpigmented viable lesion approximately 7 mm between the first and second digits greater than 10 years but mostly most recently over last few months just won't close. No erythema, no purulent discharge, no pigmentation. 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 Again no erythema, no signs of infection, no indurated tissue, no clinical signs of malignancy seen right foot. We are recommending taking a wider opening flap in order to close this area using underlying Vicryl stitch and closed with a nylon stitch. The risks and possible complications, including infection, and failure to close the gap, were discussed as possible risks and complications. She understands and is fully agreeable to the procedure. Medications: Claritin, citalopram, biotin, collagen, hydr (bovine) (bulk), diclofenac sodium, multivitamin, gabapentin, mupirocin  Allergies: Patient has no known allergies. Medical History: Acute arthritis (disorder) , History of multiple allergies (situation)                  Surgical History: Appendectomy, Orthopedic, Unkn, Web Toes.          Family History: Natural Father  - Bilateral cataracts (disorder) , Blood clot (morphologic abnormality) , Acute arthritis (disorder) ,Natural Mother  - Ischemic stroke (disorder) , Heart disease (disorder)          Social History: Alcohol -Drinking described as Occasionally. Professional -Retired. Smoker -Current Smoker, 1 pack per day.          General/constitutional: No malaise, no transient myalgia or arthralgia, patient afebrile. No loss of strength, no recent weight loss or weight gain unexplained.     HEENT: No headaches, dizziness.  No vision loss as of late, no neck stiffness, no swelling along the lymphatic channels.     Cardiovascular: No chest pain, no dyspnea on exertion, no palpitations.     Respiratory: No wheezing, no shortness of breath, no unexplained fever or night sweats.     GI: No abdominal pain, nausea, no vomiting.     : No oliguria, polyuria or hematuria.      Orthopedic: No lower back pain or stiffness, no radiation from the hip. No increase or new upper extremity weakness.     ROS: No malaise, no shortness of breath, no posterior calf pain noted bilateral        Lower extremity examination:        Integument: Warm, supple, normal turgor. No pigmented lesion seen. No significant dryness distal digits bilaterally. As stated above right foot. Vascular: Palpable pedal pulses bilaterally, no loss of hair growth noted to the distal digits bilaterally. Capillary refill time less than 3 seconds to digits 1-5 bilaterally. No swelling or induration along the lymphatic channels.     Neurological: Grossly intact bilaterally     Musculoskeletal: 5/5 muscle strength of all muscle groups of the foot and ankle bilaterally. Calf girth within normal limits and symmetrical.      Assessment: Non-closing noninfected appearing wound right foot.     Treatment plan: As stated above, patient is failing basic conservative and clinical treatments, were opting for wide excisional approach with flap creation using Vicryl internally at the subcuticular level and external nylon to close this area. The risks and possible complications were discussed with the patient thoroughly.  She is fully agreeable to the surgical procedure.        Luan Dennis DPM, Rena Fleming 3683  Board Certified, American Board of Foot and [place] [address]

## 2020-03-18 NOTE — BRIEF OP NOTE
BRIEF OPERATIVE NOTE    Date of Procedure: 3/18/2020   Preoperative Diagnosis: OPEN WOUND RIGHT FOOT  Postoperative Diagnosis: OPEN WOUND RIGHT FOOT    Procedure(s):  EXCISION AND REPAIR WOUND RIGHT FOOT  Surgeon(s) and Role:    Nataly Hebert DPM - Primary         Surgical Assistant:None    Surgical Staff:  Circ-1: Yuriy Noriega  Local Nurse Monitor: Suresh Jack RN  Scrub Tech-1: RT Jonathon  Event Time In   Incision Start 1313   Incision Close 1329     Anesthesia: Local   Estimated Blood Loss: 3 mL  Specimens: None   Findings: Macerated wound right fot   Complications: None  Implants: None

## 2020-03-18 NOTE — DISCHARGE INSTRUCTIONS
-Leave bandage on, clean and dry  -Use surgical shoe for walking, but still limit all walking to only necessary  -Take pain tablets only as needed  --Elevate right foot, rest, see Dr. Randell Beltran in 2 days on Friday      DISCHARGE SUMMARY from Nurse    The following personal items collected during your admission are returned to you:   Dental Appliance: Dental Appliances: None  Vision: Visual Aid: None  Hearing Aid:    Jewelry: Jewelry: None  Clothing: Clothing: With patient  Other Valuables: Other Valuables: None  Valuables sent to safe: If you were given prescriptions, please review the written information on prescribed medications. · You will receive a Post Operative Call from one of the Recovery Room Nurses on the day after your surgery to check on you. It is very important for us to know how you are recovering after your surgery. · You may receive an e-mail or letter in the mail from [employer] regarding your experience with us in the Ambulatory Surgery Unit. Your feedback is valuable to us and we appreciate your participation in the survey. If you have not had your influenza or pneumococcal vaccines, please follow up with your primary care physician. The discharge information has been reviewed with the patient. The patient verbalized understanding.

## 2020-03-19 NOTE — OP NOTES
[address]  OPERATIVE REPORT    Name:  Jackey Jeans  MR#:  [id number]  :  1955  ACCOUNT #:  [de-identified]  DATE OF SERVICE:  2020    LOCATION:  [facility] Ambulatory Unit. PREOPERATIVE DIAGNOSIS:  Noninfected appearing wound of right foot. POSTOPERATIVE DIAGNOSIS:  Noninfected appearing wound of right foot. PROCEDURE PERFORMED:  Debridement with re-apposition of wound, distal first toe webspace, right foot. SURGEON:  Maria De Jesus Ortiz MD    ASSISTANT:  None. ANESTHESIA:  Local.    INJECTABLES:  14 mL of 0.5% Marcaine with epinephrine in right foot. COMPLICATIONS:  None apparent. SPECIMENS REMOVED:  None. IMPLANTS:  None. ESTIMATED BLOOD LOSS:  4 mL. FINDINGS:  Dehisced wound that appears to be noninfected appearing, distal right foot. OPERATIVE TECHNIQUE:  The patient was brought to the operating room and placed on the operating table in supine position. Nurse monitored vitals. Local anesthesia was utilized in the right foot. The right foot was then prepped and draped in the usual aseptic technique and an Esmarch bandage was utilized at mid foot level for additional hemostasis in addition to the Marcaine with epinephrine. Access was gained onto her syndactylized first and second digit, where there was a chronic wound showing macerated tissue. No purulence was encountered and this wound was deepened slightly 8 mm dorsally and plantarly down to dermal tissue. The macerated tissue was debrided, showing good bleeding tissue edges. No undermining to deep tissues were seen, no purulence, and only dermal tissue was encountered. At this point, the area was scrubbed with a sterile saline soaked gauze. Next, 4-0 Vicryl was utilized in a running subcuticular stitch fashion to coaptate the wound at the subcuticular level. Next, the wound was then repaired further with 4-0 nylon in simple interrupted fashion.   At this point, the wound was completely closed and the Esmarch bandage was released showing immediate hyperemic flush to digits 1 through 5 on the right side. Dressing was completed with Xeroform, 4x4, 3-inch Pamela, and 3-inch Coban, right foot. The patient tolerated the above procedure and anesthesia well, and was discharged from the operating room with vital signs stable and vascular status intact to the right foot. Prognosis for this patient was satisfactory.         Ceasar Morocho MD      [id number]  D:  03/18/2020 13:43  T:  03/19/2020 0:28  JOB #:  [id number]

## 2020-07-24 ENCOUNTER — HOSPITAL ENCOUNTER (OUTPATIENT)
Dept: MRI IMAGING | Age: 65
Discharge: HOME | End: 2020-07-24
Attending: PHYSICAL MEDICINE & REHABILITATION
Payer: MEDICARE

## 2020-07-24 DIAGNOSIS — M75.101 ROTATOR CUFF SYNDROME OF RIGHT SHOULDER: ICD-10-CM

## 2020-07-24 PROCEDURE — 73221 MRI JOINT UPR EXTREM W/O DYE: CPT

## 2022-02-16 ENCOUNTER — TRANSCRIBE ORDER (OUTPATIENT)
Dept: SCHEDULING | Age: 67
End: 2022-02-16

## 2022-02-16 DIAGNOSIS — M54.9 MID BACK PAIN: Primary | ICD-10-CM

## 2022-02-16 DIAGNOSIS — M51.34 DDD (DEGENERATIVE DISC DISEASE), THORACIC: ICD-10-CM

## 2022-02-16 DIAGNOSIS — M47.814 THORACIC SPONDYLOSIS: ICD-10-CM

## 2022-03-03 ENCOUNTER — HOSPITAL ENCOUNTER (OUTPATIENT)
Dept: MRI IMAGING | Age: 67
Discharge: HOME | End: 2022-03-03
Attending: NURSE PRACTITIONER
Payer: MEDICARE

## 2022-03-03 DIAGNOSIS — M54.9 MID BACK PAIN: ICD-10-CM

## 2022-03-03 DIAGNOSIS — M51.34 DDD (DEGENERATIVE DISC DISEASE), THORACIC: ICD-10-CM

## 2022-03-03 DIAGNOSIS — M47.814 THORACIC SPONDYLOSIS: ICD-10-CM

## 2022-03-03 PROCEDURE — 72146 MRI CHEST SPINE W/O DYE: CPT

## 2023-05-11 RX ORDER — LORATADINE 10 MG/1
10 TABLET ORAL DAILY
COMMUNITY

## 2023-05-11 RX ORDER — GABAPENTIN 300 MG/1
CAPSULE ORAL 3 TIMES DAILY
COMMUNITY

## 2023-05-11 RX ORDER — LORAZEPAM 1 MG/1
TABLET ORAL EVERY 4 HOURS PRN
COMMUNITY

## 2023-05-11 RX ORDER — DICLOFENAC SODIUM 75 MG/1
TABLET, DELAYED RELEASE ORAL 2 TIMES DAILY
COMMUNITY

## 2023-05-11 RX ORDER — CITALOPRAM 20 MG/1
30 TABLET ORAL DAILY
COMMUNITY

## 2023-08-29 ENCOUNTER — TELEPHONE (OUTPATIENT)
Age: 68
End: 2023-08-29

## 2023-08-29 ENCOUNTER — HOSPITAL ENCOUNTER (OUTPATIENT)
Facility: HOSPITAL | Age: 68
Discharge: HOME | End: 2023-08-29
Payer: MEDICARE

## 2023-08-29 VITALS
HEART RATE: 77 BPM | RESPIRATION RATE: 18 BRPM | DIASTOLIC BLOOD PRESSURE: 62 MMHG | TEMPERATURE: 97.5 F | SYSTOLIC BLOOD PRESSURE: 113 MMHG

## 2023-08-29 DIAGNOSIS — L97.522 CHRONIC FOOT ULCER WITH FAT LAYER EXPOSED, LEFT (HCC): Primary | ICD-10-CM

## 2023-08-29 PROCEDURE — 99204 OFFICE O/P NEW MOD 45 MIN: CPT | Performed by: PODIATRIST

## 2023-08-29 PROCEDURE — 99213 OFFICE O/P EST LOW 20 MIN: CPT

## 2023-08-29 RX ORDER — BACITRACIN ZINC AND POLYMYXIN B SULFATE 500; 1000 [USP'U]/G; [USP'U]/G
OINTMENT TOPICAL ONCE
OUTPATIENT
Start: 2023-08-29 | End: 2023-08-29

## 2023-08-29 RX ORDER — LIDOCAINE HYDROCHLORIDE 20 MG/ML
JELLY TOPICAL ONCE
Status: CANCELLED | OUTPATIENT
Start: 2023-08-29 | End: 2023-08-29

## 2023-08-29 RX ORDER — IBUPROFEN 200 MG
TABLET ORAL ONCE
OUTPATIENT
Start: 2023-08-29 | End: 2023-08-29

## 2023-08-29 RX ORDER — LIDOCAINE HYDROCHLORIDE 40 MG/ML
SOLUTION TOPICAL ONCE
OUTPATIENT
Start: 2023-08-29 | End: 2023-08-29

## 2023-08-29 RX ORDER — SODIUM CHLOR/HYPOCHLOROUS ACID 0.033 %
SOLUTION, IRRIGATION IRRIGATION ONCE
Status: CANCELLED | OUTPATIENT
Start: 2023-08-29 | End: 2023-08-29

## 2023-08-29 RX ORDER — NIACINAMIDE 500 MG
500 TABLET ORAL 2 TIMES DAILY WITH MEALS
COMMUNITY

## 2023-08-29 RX ORDER — LIDOCAINE 40 MG/G
CREAM TOPICAL ONCE
OUTPATIENT
Start: 2023-08-29 | End: 2023-08-29

## 2023-08-29 RX ORDER — BACITRACIN ZINC AND POLYMYXIN B SULFATE 500; 1000 [USP'U]/G; [USP'U]/G
OINTMENT TOPICAL ONCE
Status: CANCELLED | OUTPATIENT
Start: 2023-08-29 | End: 2023-08-29

## 2023-08-29 RX ORDER — BETAMETHASONE DIPROPIONATE 0.05 %
OINTMENT (GRAM) TOPICAL ONCE
Status: CANCELLED | OUTPATIENT
Start: 2023-08-29 | End: 2023-08-29

## 2023-08-29 RX ORDER — GENTAMICIN SULFATE 1 MG/G
OINTMENT TOPICAL ONCE
Status: CANCELLED | OUTPATIENT
Start: 2023-08-29 | End: 2023-08-29

## 2023-08-29 RX ORDER — CLOBETASOL PROPIONATE 0.5 MG/G
OINTMENT TOPICAL ONCE
Status: CANCELLED | OUTPATIENT
Start: 2023-08-29 | End: 2023-08-29

## 2023-08-29 RX ORDER — LIDOCAINE 40 MG/G
CREAM TOPICAL ONCE
Status: CANCELLED | OUTPATIENT
Start: 2023-08-29 | End: 2023-08-29

## 2023-08-29 RX ORDER — LIDOCAINE HYDROCHLORIDE 20 MG/ML
JELLY TOPICAL ONCE
OUTPATIENT
Start: 2023-08-29 | End: 2023-08-29

## 2023-08-29 RX ORDER — BETAMETHASONE DIPROPIONATE 0.05 %
OINTMENT (GRAM) TOPICAL ONCE
OUTPATIENT
Start: 2023-08-29 | End: 2023-08-29

## 2023-08-29 RX ORDER — SODIUM CHLOR/HYPOCHLOROUS ACID 0.033 %
SOLUTION, IRRIGATION IRRIGATION ONCE
OUTPATIENT
Start: 2023-08-29 | End: 2023-08-29

## 2023-08-29 RX ORDER — NICOTINE POLACRILEX 2 MG
1 GUM BUCCAL
COMMUNITY

## 2023-08-29 RX ORDER — M-VIT,TX,IRON,MINS/CALC/FOLIC 27MG-0.4MG
1 TABLET ORAL DAILY
COMMUNITY

## 2023-08-29 RX ORDER — GENTAMICIN SULFATE 1 MG/G
OINTMENT TOPICAL ONCE
OUTPATIENT
Start: 2023-08-29 | End: 2023-08-29

## 2023-08-29 RX ORDER — GINSENG 100 MG
CAPSULE ORAL ONCE
Status: CANCELLED | OUTPATIENT
Start: 2023-08-29 | End: 2023-08-29

## 2023-08-29 RX ORDER — LIDOCAINE HYDROCHLORIDE 40 MG/ML
SOLUTION TOPICAL ONCE
Status: CANCELLED | OUTPATIENT
Start: 2023-08-29 | End: 2023-08-29

## 2023-08-29 RX ORDER — CLOBETASOL PROPIONATE 0.5 MG/G
OINTMENT TOPICAL ONCE
OUTPATIENT
Start: 2023-08-29 | End: 2023-08-29

## 2023-08-29 RX ORDER — IBUPROFEN 200 MG
TABLET ORAL ONCE
Status: CANCELLED | OUTPATIENT
Start: 2023-08-29 | End: 2023-08-29

## 2023-08-29 RX ORDER — ESTRADIOL AND NORETHINDRONE ACETATE .5; .1 MG/1; MG/1
1 TABLET ORAL DAILY
COMMUNITY

## 2023-08-29 RX ORDER — SILVER 2" X 2"
BANDAGE TOPICAL
Qty: 40 G | Refills: 2 | Status: SHIPPED | OUTPATIENT
Start: 2023-08-29

## 2023-08-29 RX ORDER — LIDOCAINE 50 MG/G
OINTMENT TOPICAL ONCE
Status: CANCELLED | OUTPATIENT
Start: 2023-08-29 | End: 2023-08-29

## 2023-08-29 RX ORDER — LIDOCAINE 50 MG/G
OINTMENT TOPICAL ONCE
OUTPATIENT
Start: 2023-08-29 | End: 2023-08-29

## 2023-08-29 RX ORDER — GINSENG 100 MG
CAPSULE ORAL ONCE
OUTPATIENT
Start: 2023-08-29 | End: 2023-08-29

## 2023-08-29 NOTE — DISCHARGE INSTRUCTIONS
Discharge Instructions/Wound [address] [facility] [address], [address] [facility], [place]  Telephone: [phone]  WOUND CARE ORDERS:   Left 2 nd toe medial :Cleanse with normal saline , apply primary dressing Collagencover with secondary dressing   Gauze . Pt./pcg/HH nurse to change (freq) 2x Weekly and as needed for compromise. F/U in 1 week. TREATMENT ORDERS:  Turn/reposition every 2 hours when in bed, avoid direct pressure on wound site. When sitting, shift position or do seat lifts every 15 minutes. Limit side lying to 30 degree tilt. Limit HOB elevation to 30 degrees. Use speciality pressure relief cushion, mattress as appropriate. Follow diet as prescribed:  [x] Diet as tolerated: [] Calorie Diabetic Diet:Low carb and no Sugar [x] No Added Salt:[] Increase Protein: [] Other:Limit the amount of liquid you are drinking and avoid drinking in between meals              Return Appointment:  [x] Return Appointment: With Dr. Nissa Polk   in  1 Week(s)     Electronically signed Lex Son RN on 8/29/2023 at 2:53 PM     [address]: Should you experience any significant changes in your wound(s) or have questions about your wound care, please contact the [address] [place] [address] at [address] [place] [address] 8:00 am - 4:30. If you need help with your wound outside these hours and cannot wait until we are again available, contact your PCP or go to the hospital emergency room. PLEASE NOTE: IF YOU ARE UNABLE TO OBTAIN WOUND SUPPLIES, CONTINUE TO USE THE SUPPLIES YOU HAVE AVAILABLE UNTIL YOU ARE ABLE TO REACH US. IT IS MOST IMPORTANT TO KEEP THE WOUND COVERED AT ALL TIMES.      Physician Signature:_______________________    Date: ___________ Time:  ____________

## 2023-08-29 NOTE — FLOWSHEET NOTE
08/29/23 1459   Wound 08/29/23 Toe (Comment  which one) Left;Medial 2 nd toe # 1   Date First Assessed/Time First Assessed: 08/29/23 1428   Present on Hospital Admission: Yes  Wound Approximate Age at First Assessment (Weeks): 8 weeks  Primary Wound Type: Pressure Injury  Location: Toe (Comment  which one)  Wound Location Orientatio. ..    Dressing Status New dressing applied   Dressing/Treatment Collagen with Ag;Gauze dressing/dressing sponge

## 2023-08-29 NOTE — TELEPHONE ENCOUNTER
Michel Amador from [address] called in regards to patient needing a different prescription due to the pharmacy not having any at all. Is there something else she could be prescribed? Please advise, thank you.

## 2023-09-05 ENCOUNTER — HOSPITAL ENCOUNTER (OUTPATIENT)
Facility: HOSPITAL | Age: 68
Discharge: HOME | End: 2023-09-05
Payer: MEDICARE

## 2023-09-05 VITALS
DIASTOLIC BLOOD PRESSURE: 59 MMHG | HEART RATE: 75 BPM | SYSTOLIC BLOOD PRESSURE: 122 MMHG | RESPIRATION RATE: 18 BRPM | TEMPERATURE: 97.4 F

## 2023-09-05 DIAGNOSIS — L97.522 CHRONIC FOOT ULCER WITH FAT LAYER EXPOSED, LEFT (HCC): Primary | ICD-10-CM

## 2023-09-05 PROCEDURE — 99213 OFFICE O/P EST LOW 20 MIN: CPT

## 2023-09-05 RX ORDER — BACITRACIN ZINC AND POLYMYXIN B SULFATE 500; 1000 [USP'U]/G; [USP'U]/G
OINTMENT TOPICAL ONCE
OUTPATIENT
Start: 2023-09-05 | End: 2023-09-05

## 2023-09-05 RX ORDER — LIDOCAINE HYDROCHLORIDE 20 MG/ML
JELLY TOPICAL ONCE
OUTPATIENT
Start: 2023-09-05 | End: 2023-09-05

## 2023-09-05 RX ORDER — BETAMETHASONE DIPROPIONATE 0.05 %
OINTMENT (GRAM) TOPICAL ONCE
OUTPATIENT
Start: 2023-09-05 | End: 2023-09-05

## 2023-09-05 RX ORDER — IBUPROFEN 200 MG
TABLET ORAL ONCE
OUTPATIENT
Start: 2023-09-05 | End: 2023-09-05

## 2023-09-05 RX ORDER — LIDOCAINE 40 MG/G
CREAM TOPICAL ONCE
OUTPATIENT
Start: 2023-09-05 | End: 2023-09-05

## 2023-09-05 RX ORDER — LIDOCAINE 50 MG/G
OINTMENT TOPICAL ONCE
OUTPATIENT
Start: 2023-09-05 | End: 2023-09-05

## 2023-09-05 RX ORDER — CLOBETASOL PROPIONATE 0.5 MG/G
OINTMENT TOPICAL ONCE
OUTPATIENT
Start: 2023-09-05 | End: 2023-09-05

## 2023-09-05 RX ORDER — GINSENG 100 MG
CAPSULE ORAL ONCE
OUTPATIENT
Start: 2023-09-05 | End: 2023-09-05

## 2023-09-05 RX ORDER — GENTAMICIN SULFATE 1 MG/G
OINTMENT TOPICAL ONCE
OUTPATIENT
Start: 2023-09-05 | End: 2023-09-05

## 2023-09-05 RX ORDER — SODIUM CHLOR/HYPOCHLOROUS ACID 0.033 %
SOLUTION, IRRIGATION IRRIGATION ONCE
OUTPATIENT
Start: 2023-09-05 | End: 2023-09-05

## 2023-09-05 RX ORDER — LIDOCAINE HYDROCHLORIDE 40 MG/ML
SOLUTION TOPICAL ONCE
OUTPATIENT
Start: 2023-09-05 | End: 2023-09-05

## 2023-09-05 NOTE — DISCHARGE INSTRUCTIONS
Discharge Instructions/Wound Care Orders  [address] [place] [address] [facility] [address]  Telephone: [phone]     Wound Care Orders:  Left foot/toe wound :Cleanse with normal saline, apply primary dressing Collagen cover with secondary dressing Gauze. Pt./pcg/HH nurse to change (freq) 3x Weekly  and as needed for compromise. F/U in 6 week. Treatment Orders:   Elevate leg(s) above the level of the heart when sitting, if swelling present. Avoid prolonged standing in one place. Wear off-loading shoe/boot on the affected foot when walking. Do not get dressing/cast wet. Do not use objects to scratch inside cast/wrap. Follow diet as prescribed:  [] Diet as tolerated: [] Diabetic Diet: Low carb no sugar [] No Added Salt:  [] Increase Protein: [] Other:Limit the amount of liquid you are drinking and avoid drinking in between meals             Follow-up:  [x] Return Appointment: With Dr. Jamila Rehman in  [facility])  [] Ordered tests:    Electronically signed Drake Gomes RN on 9/5/2023 at 4:33 PM     [address] [place] [address] Information: Should you experience any significant changes in your wound(s) or have questions about your wound care, please contact the [address] [place] [address] at [address] [place] [address] 8:00 am - 4:30. If you need help with your wound outside these hours and cannot wait until we are again available, contact your PCP or go to the hospital emergency room. PLEASE NOTE: IF YOU ARE UNABLE TO OBTAIN WOUND SUPPLIES, CONTINUE TO USE THE SUPPLIES YOU HAVE AVAILABLE UNTIL YOU ARE ABLE TO REACH US. IT IS MOST IMPORTANT TO KEEP THE WOUND COVERED AT ALL TIMES.      Physician Signature:_______________________    Date: ___________ Time:  ____________ No

## 2023-09-09 ENCOUNTER — HOSPITAL ENCOUNTER (OUTPATIENT)
Facility: HOSPITAL | Age: 68
End: 2023-09-09
Payer: MEDICARE

## 2023-09-09 DIAGNOSIS — M23.91 INTERNAL DERANGEMENT OF RIGHT KNEE: ICD-10-CM

## 2023-09-09 PROCEDURE — 73721 MRI JNT OF LWR EXTRE W/O DYE: CPT

## 2024-02-22 ENCOUNTER — TELEPHONE (OUTPATIENT)
Age: 69
End: 2024-02-22

## 2024-02-22 NOTE — TELEPHONE ENCOUNTER
Spoke with patient to let her know I will be working on getting her surgery scheduled.    Faxed clearance form to PCP and patient to follow up with Socorro Sherman's office today.    Per insurance: no Prior Auth required

## 2024-02-22 NOTE — TELEPHONE ENCOUNTER
----- Message from Foreign Oates DPM sent at 2/20/2024  2:48 PM EST -----  Regarding: Surgery scheduled at [facility],     Any chance we can schedule this pt for correction of angular toe deformity to the left 2, 3 and 4th toes    Thanks!

## 2024-03-08 ENCOUNTER — TELEPHONE (OUTPATIENT)
Age: 69
End: 2024-03-08

## 2024-03-08 NOTE — TELEPHONE ENCOUNTER
Pt had PCP clearance done 03/06/24 for surgery but is not currently scheduled. Where would you like to have patient scheduled?

## 2024-03-11 ENCOUNTER — TELEPHONE (OUTPATIENT)
Age: 69
End: 2024-03-11

## 2024-03-13 ENCOUNTER — TELEPHONE (OUTPATIENT)
Age: 69
End: 2024-03-13

## 2024-03-13 NOTE — TELEPHONE ENCOUNTER
Spoke with Sonal at surgery scheduling and a hold has been placed for 03/22/24 @ 730 for 90 minutes for pts surgery. I placed the surgery scheduling form on your desk so that I can complete the scheduling.

## 2024-03-14 ENCOUNTER — TELEPHONE (OUTPATIENT)
Age: 69
End: 2024-03-14

## 2024-03-14 NOTE — TELEPHONE ENCOUNTER
, Patient called and request that you call her as she has many questions before making the decision for surgery.   She would like to the know about her restrictions, if she should keep her dental appointment and will she need to take an antibiotic prior to surgery to name a few that she mentioned. She can be reached at [phone].

## 2024-03-26 ENCOUNTER — OFFICE VISIT (OUTPATIENT)
Age: 69
End: 2024-03-26
Payer: MEDICARE

## 2024-03-26 VITALS
SYSTOLIC BLOOD PRESSURE: 107 MMHG | HEART RATE: 66 BPM | BODY MASS INDEX: 24.14 KG/M2 | HEIGHT: 62 IN | WEIGHT: 131.2 LBS | TEMPERATURE: 98.1 F | OXYGEN SATURATION: 95 % | RESPIRATION RATE: 16 BRPM | DIASTOLIC BLOOD PRESSURE: 65 MMHG

## 2024-03-26 DIAGNOSIS — M20.5X2 CROSSOVER TOE DEFORMITY OF LEFT FOOT: Primary | ICD-10-CM

## 2024-03-26 DIAGNOSIS — M77.52 BONE SPUR OF TOE OF LEFT FOOT: ICD-10-CM

## 2024-03-26 PROCEDURE — G8427 DOCREV CUR MEDS BY ELIG CLIN: HCPCS | Performed by: PODIATRIST

## 2024-03-26 PROCEDURE — G8420 CALC BMI NORM PARAMETERS: HCPCS | Performed by: PODIATRIST

## 2024-03-26 PROCEDURE — 1090F PRES/ABSN URINE INCON ASSESS: CPT | Performed by: PODIATRIST

## 2024-03-26 PROCEDURE — G8399 PT W/DXA RESULTS DOCUMENT: HCPCS | Performed by: PODIATRIST

## 2024-03-26 PROCEDURE — 1123F ACP DISCUSS/DSCN MKR DOCD: CPT | Performed by: PODIATRIST

## 2024-03-26 PROCEDURE — 3017F COLORECTAL CA SCREEN DOC REV: CPT | Performed by: PODIATRIST

## 2024-03-26 PROCEDURE — 4004F PT TOBACCO SCREEN RCVD TLK: CPT | Performed by: PODIATRIST

## 2024-03-26 PROCEDURE — G8484 FLU IMMUNIZE NO ADMIN: HCPCS | Performed by: PODIATRIST

## 2024-03-26 PROCEDURE — 99214 OFFICE O/P EST MOD 30 MIN: CPT | Performed by: PODIATRIST

## 2024-03-26 RX ORDER — CETIRIZINE HYDROCHLORIDE 10 MG/1
10 TABLET ORAL DAILY
COMMUNITY

## 2024-03-26 NOTE — PROGRESS NOTES
Identified pt with two pt identifiers (name and ). Reviewed chart in preparation for visit and have obtained necessary documentation.    Kimmie Jamil is a 68 y.o. female  Chief Complaint   Patient presents with    Follow-up     Patient here to discuss surgery options.      /65 (Site: Left Upper Arm, Position: Sitting, Cuff Size: Large Adult)   Pulse 66   Temp 98.1 °F (36.7 °C) (Oral)   Resp 16   Ht 1.575 m (5' 2\")   Wt 59.5 kg (131 lb 3.2 oz)   LMP  (LMP Unknown)   SpO2 95%   BMI 24.00 kg/m²     1. Have you been to the ER, urgent care clinic since your last visit?  Hospitalized since your last visit?no    2. Have you seen or consulted any other health care providers outside of the [facility] since your last visit?  Include any pap smears or colon screening. no

## 2024-04-04 NOTE — PROGRESS NOTES
[facility]  Preoperative Instructions        Surgery Date 4/5/2024   Time of Arrival TBD  Contact# [phone]    1. On the day of your surgery, please report to the Surgical Services Registration Desk and sign in at your designated time. The Surgery Center is located to the right of the Emergency Room.     2. You must have someone with you to drive you home. You should not drive a car for 24 hours following surgery. Please make arrangements for a friend or family member to stay with you for the first 24 hours after your surgery.    3. Do not have anything to eat or drink (including water, gum, mints, coffee, juice) after midnight ??4/4/2024  .?This may not apply to medications prescribed by your physician. ?(Please note below the special instructions with medications to take the morning of your procedure.)    4. We recommend you do not drink any alcoholic beverages for 24 hours before and after your surgery.    5. Contact your surgeon’s office for instructions on the following medications: non-steroidal anti-inflammatory drugs (i.e. Advil, Aleve), vitamins, and supplements. (Some surgeon’s will want you to stop these medications prior to surgery and others may allow you to take them)  **If you are currently taking Plavix, Coumadin, Aspirin and/or other blood-thinning agents, contact your surgeon for instructions.** Your surgeon will partner with the physician prescribing these medications to determine if it is safe to stop or if you need to continue taking.  Please do not stop taking these medications without instructions from your surgeon    6. Wear comfortable clothes.  Wear glasses instead of contacts.  Do not bring any money or jewelry. Please bring picture ID, insurance card, and any prearranged co-payment or hospital payment.  Do not wear make-up, particularly mascara the morning of your surgery.  Do not wear nail polish, particularly if you are having foot /hand surgery.  Wear

## 2024-04-05 ENCOUNTER — HOSPITAL ENCOUNTER (OUTPATIENT)
Facility: HOSPITAL | Age: 69
Setting detail: OUTPATIENT SURGERY
Discharge: HOME | End: 2024-04-05
Attending: PODIATRIST | Admitting: PODIATRIST
Payer: MEDICARE

## 2024-04-05 ENCOUNTER — ANESTHESIA (OUTPATIENT)
Facility: HOSPITAL | Age: 69
End: 2024-04-05
Payer: MEDICARE

## 2024-04-05 ENCOUNTER — TELEPHONE (OUTPATIENT)
Age: 69
End: 2024-04-05

## 2024-04-05 ENCOUNTER — ANESTHESIA EVENT (OUTPATIENT)
Facility: HOSPITAL | Age: 69
End: 2024-04-05
Payer: MEDICARE

## 2024-04-05 VITALS
HEIGHT: 62 IN | RESPIRATION RATE: 17 BRPM | HEART RATE: 62 BPM | BODY MASS INDEX: 24.14 KG/M2 | DIASTOLIC BLOOD PRESSURE: 58 MMHG | WEIGHT: 131.17 LBS | OXYGEN SATURATION: 99 % | SYSTOLIC BLOOD PRESSURE: 116 MMHG | TEMPERATURE: 98.2 F

## 2024-04-05 DIAGNOSIS — Z98.890 POST-OPERATIVE STATE: Primary | ICD-10-CM

## 2024-04-05 PROCEDURE — 2580000003 HC RX 258

## 2024-04-05 PROCEDURE — 7100000001 HC PACU RECOVERY - ADDTL 15 MIN: Performed by: PODIATRIST

## 2024-04-05 PROCEDURE — 6370000000 HC RX 637 (ALT 250 FOR IP): Performed by: PODIATRIST

## 2024-04-05 PROCEDURE — 6360000002 HC RX W HCPCS

## 2024-04-05 PROCEDURE — 2709999900 HC NON-CHARGEABLE SUPPLY: Performed by: PODIATRIST

## 2024-04-05 PROCEDURE — 3700000000 HC ANESTHESIA ATTENDED CARE: Performed by: PODIATRIST

## 2024-04-05 PROCEDURE — 7100000010 HC PHASE II RECOVERY - FIRST 15 MIN: Performed by: PODIATRIST

## 2024-04-05 PROCEDURE — 2500000003 HC RX 250 WO HCPCS: Performed by: PODIATRIST

## 2024-04-05 PROCEDURE — 76000 FLUOROSCOPY <1 HR PHYS/QHP: CPT | Performed by: PODIATRIST

## 2024-04-05 PROCEDURE — 3600000013 HC SURGERY LEVEL 3 ADDTL 15MIN: Performed by: PODIATRIST

## 2024-04-05 PROCEDURE — 3600000003 HC SURGERY LEVEL 3 BASE: Performed by: PODIATRIST

## 2024-04-05 PROCEDURE — 6370000000 HC RX 637 (ALT 250 FOR IP): Performed by: STUDENT IN AN ORGANIZED HEALTH CARE EDUCATION/TRAINING PROGRAM

## 2024-04-05 PROCEDURE — 28313 REPAIR DEFORMITY OF TOE: CPT | Performed by: PODIATRIST

## 2024-04-05 PROCEDURE — 2580000003 HC RX 258: Performed by: STUDENT IN AN ORGANIZED HEALTH CARE EDUCATION/TRAINING PROGRAM

## 2024-04-05 PROCEDURE — 2500000003 HC RX 250 WO HCPCS

## 2024-04-05 PROCEDURE — 3700000001 HC ADD 15 MINUTES (ANESTHESIA): Performed by: PODIATRIST

## 2024-04-05 PROCEDURE — 7100000000 HC PACU RECOVERY - FIRST 15 MIN: Performed by: PODIATRIST

## 2024-04-05 PROCEDURE — 28153 PARTIAL REMOVAL OF TOE: CPT | Performed by: PODIATRIST

## 2024-04-05 RX ORDER — OXYCODONE HYDROCHLORIDE 5 MG/1
5 TABLET ORAL
Status: COMPLETED | OUTPATIENT
Start: 2024-04-05 | End: 2024-04-05

## 2024-04-05 RX ORDER — SODIUM CHLORIDE, SODIUM LACTATE, POTASSIUM CHLORIDE, CALCIUM CHLORIDE 600; 310; 30; 20 MG/100ML; MG/100ML; MG/100ML; MG/100ML
INJECTION, SOLUTION INTRAVENOUS CONTINUOUS PRN
Status: DISCONTINUED | OUTPATIENT
Start: 2024-04-05 | End: 2024-04-05 | Stop reason: SDUPTHER

## 2024-04-05 RX ORDER — PROPOFOL 10 MG/ML
INJECTION, EMULSION INTRAVENOUS CONTINUOUS PRN
Status: DISCONTINUED | OUTPATIENT
Start: 2024-04-05 | End: 2024-04-05 | Stop reason: SDUPTHER

## 2024-04-05 RX ORDER — OXYCODONE HYDROCHLORIDE AND ACETAMINOPHEN 5; 325 MG/1; MG/1
1 TABLET ORAL EVERY 6 HOURS PRN
Qty: 12 TABLET | Refills: 0 | Status: SHIPPED | OUTPATIENT
Start: 2024-04-05 | End: 2024-04-08

## 2024-04-05 RX ORDER — ONDANSETRON 2 MG/ML
INJECTION INTRAMUSCULAR; INTRAVENOUS PRN
Status: DISCONTINUED | OUTPATIENT
Start: 2024-04-05 | End: 2024-04-05 | Stop reason: SDUPTHER

## 2024-04-05 RX ORDER — FENTANYL CITRATE 50 UG/ML
INJECTION, SOLUTION INTRAMUSCULAR; INTRAVENOUS PRN
Status: DISCONTINUED | OUTPATIENT
Start: 2024-04-05 | End: 2024-04-05 | Stop reason: SDUPTHER

## 2024-04-05 RX ORDER — SODIUM CHLORIDE 0.9 % (FLUSH) 0.9 %
5-40 SYRINGE (ML) INJECTION PRN
Status: DISCONTINUED | OUTPATIENT
Start: 2024-04-05 | End: 2024-04-05 | Stop reason: HOSPADM

## 2024-04-05 RX ORDER — CEFAZOLIN SODIUM 1 G/3ML
INJECTION, POWDER, FOR SOLUTION INTRAMUSCULAR; INTRAVENOUS PRN
Status: DISCONTINUED | OUTPATIENT
Start: 2024-04-05 | End: 2024-04-05 | Stop reason: SDUPTHER

## 2024-04-05 RX ORDER — SODIUM CHLORIDE 0.9 % (FLUSH) 0.9 %
5-40 SYRINGE (ML) INJECTION EVERY 12 HOURS SCHEDULED
Status: DISCONTINUED | OUTPATIENT
Start: 2024-04-05 | End: 2024-04-05 | Stop reason: HOSPADM

## 2024-04-05 RX ORDER — LIDOCAINE HYDROCHLORIDE 20 MG/ML
INJECTION, SOLUTION EPIDURAL; INFILTRATION; INTRACAUDAL; PERINEURAL PRN
Status: DISCONTINUED | OUTPATIENT
Start: 2024-04-05 | End: 2024-04-05 | Stop reason: SDUPTHER

## 2024-04-05 RX ORDER — HYDROMORPHONE HYDROCHLORIDE 1 MG/ML
0.5 INJECTION, SOLUTION INTRAMUSCULAR; INTRAVENOUS; SUBCUTANEOUS EVERY 5 MIN PRN
Status: DISCONTINUED | OUTPATIENT
Start: 2024-04-05 | End: 2024-04-05 | Stop reason: HOSPADM

## 2024-04-05 RX ORDER — LIDOCAINE HYDROCHLORIDE 10 MG/ML
INJECTION, SOLUTION EPIDURAL; INFILTRATION; INTRACAUDAL; PERINEURAL PRN
Status: DISCONTINUED | OUTPATIENT
Start: 2024-04-05 | End: 2024-04-05 | Stop reason: ALTCHOICE

## 2024-04-05 RX ORDER — PHENYLEPHRINE HCL IN 0.9% NACL 0.4MG/10ML
SYRINGE (ML) INTRAVENOUS PRN
Status: DISCONTINUED | OUTPATIENT
Start: 2024-04-05 | End: 2024-04-05 | Stop reason: SDUPTHER

## 2024-04-05 RX ORDER — NALOXONE HYDROCHLORIDE 0.4 MG/ML
INJECTION, SOLUTION INTRAMUSCULAR; INTRAVENOUS; SUBCUTANEOUS PRN
Status: DISCONTINUED | OUTPATIENT
Start: 2024-04-05 | End: 2024-04-05 | Stop reason: HOSPADM

## 2024-04-05 RX ORDER — FENTANYL CITRATE 50 UG/ML
25 INJECTION, SOLUTION INTRAMUSCULAR; INTRAVENOUS EVERY 5 MIN PRN
Status: DISCONTINUED | OUTPATIENT
Start: 2024-04-05 | End: 2024-04-05 | Stop reason: HOSPADM

## 2024-04-05 RX ORDER — ONDANSETRON 2 MG/ML
4 INJECTION INTRAMUSCULAR; INTRAVENOUS
Status: DISCONTINUED | OUTPATIENT
Start: 2024-04-05 | End: 2024-04-05 | Stop reason: HOSPADM

## 2024-04-05 RX ORDER — MIDAZOLAM HYDROCHLORIDE 1 MG/ML
INJECTION INTRAMUSCULAR; INTRAVENOUS PRN
Status: DISCONTINUED | OUTPATIENT
Start: 2024-04-05 | End: 2024-04-05 | Stop reason: SDUPTHER

## 2024-04-05 RX ORDER — SODIUM CHLORIDE, SODIUM LACTATE, POTASSIUM CHLORIDE, CALCIUM CHLORIDE 600; 310; 30; 20 MG/100ML; MG/100ML; MG/100ML; MG/100ML
INJECTION, SOLUTION INTRAVENOUS ONCE
Status: COMPLETED | OUTPATIENT
Start: 2024-04-05 | End: 2024-04-05

## 2024-04-05 RX ADMIN — PROPOFOL 100 MCG/KG/MIN: 10 INJECTION, EMULSION INTRAVENOUS at 10:36

## 2024-04-05 RX ADMIN — SODIUM CHLORIDE, POTASSIUM CHLORIDE, SODIUM LACTATE AND CALCIUM CHLORIDE: 600; 310; 30; 20 INJECTION, SOLUTION INTRAVENOUS at 09:51

## 2024-04-05 RX ADMIN — SODIUM CHLORIDE, POTASSIUM CHLORIDE, SODIUM LACTATE AND CALCIUM CHLORIDE: 600; 310; 30; 20 INJECTION, SOLUTION INTRAVENOUS at 10:31

## 2024-04-05 RX ADMIN — CEFAZOLIN 2 G: 1 INJECTION, POWDER, FOR SOLUTION INTRAMUSCULAR; INTRAVENOUS; PARENTERAL at 10:36

## 2024-04-05 RX ADMIN — MIDAZOLAM HYDROCHLORIDE 2 MG: 1 INJECTION, SOLUTION INTRAMUSCULAR; INTRAVENOUS at 10:30

## 2024-04-05 RX ADMIN — ONDANSETRON 4 MG: 2 INJECTION INTRAMUSCULAR; INTRAVENOUS at 10:52

## 2024-04-05 RX ADMIN — OXYCODONE 5 MG: 5 TABLET ORAL at 12:08

## 2024-04-05 RX ADMIN — Medication 3 AMPULE: at 09:52

## 2024-04-05 RX ADMIN — Medication 40 MCG: at 10:58

## 2024-04-05 RX ADMIN — FENTANYL CITRATE 50 MCG: 50 INJECTION, SOLUTION INTRAMUSCULAR; INTRAVENOUS at 10:46

## 2024-04-05 RX ADMIN — LIDOCAINE HYDROCHLORIDE 40 MG: 20 INJECTION, SOLUTION EPIDURAL; INFILTRATION; INTRACAUDAL; PERINEURAL at 10:35

## 2024-04-05 ASSESSMENT — PAIN - FUNCTIONAL ASSESSMENT: PAIN_FUNCTIONAL_ASSESSMENT: NONE - DENIES PAIN

## 2024-04-05 ASSESSMENT — PAIN DESCRIPTION - DESCRIPTORS: DESCRIPTORS: ACHING

## 2024-04-05 ASSESSMENT — PAIN DESCRIPTION - LOCATION: LOCATION: FOOT

## 2024-04-05 ASSESSMENT — PAIN SCALES - GENERAL: PAINLEVEL_OUTOF10: 5

## 2024-04-05 ASSESSMENT — PAIN DESCRIPTION - PAIN TYPE: TYPE: SURGICAL PAIN

## 2024-04-05 ASSESSMENT — PAIN DESCRIPTION - ORIENTATION: ORIENTATION: LEFT

## 2024-04-05 ASSESSMENT — LIFESTYLE VARIABLES: SMOKING_STATUS: 1

## 2024-04-05 NOTE — PERIOP NOTE
0917 - PT DENIES FEVER, COLD, COUGH, SOB, N/V, DIARRHEA.....  PRE-OP TCHING DONE - PT VERBALIZES UNDERSTANDING.  STRETCHER IN LOWEST POSITION, CB IN PLACE, SR UP X2.

## 2024-04-05 NOTE — H&P
[facility] PODIATRY & FOOT SURGERY     PREOP HISTORY & PHYSICAL      Subjective:         Patient is a 68 y.o. female who is being seen for outpatient surgical correction of a left great toe and left second toe deformity. She has failed conservative therapy      Patient Active Problem List    Diagnosis Date Noted    Chronic foot ulcer with fat layer exposed, left (HCC) 08/29/2023     Past Medical History:   Diagnosis Date    Anxiety     Arthritis     Cancer (HCC)     basal on face    H/O seasonal allergies       Past Surgical History:   Procedure Laterality Date    APPENDECTOMY  age 10    CATARACT REMOVAL Bilateral     COLONOSCOPY      DILATION AND CURETTAGE OF UTERUS      TOTAL KNEE ARTHROPLASTY Right 10/25/2023      Family History   Problem Relation Age of Onset    Stroke Mother     Pneumonia Father       Social History     Tobacco Use    Smoking status: Every Day     Current packs/day: 0.50     Average packs/day: 0.5 packs/day for 55.3 years (27.6 ttl pk-yrs)     Types: Cigarettes     Start date: 1969    Smokeless tobacco: Never   Substance Use Topics    Alcohol use: Yes     Comment: socially     No current facility-administered medications for this encounter.      No Known Allergies     Review of Systems:    Constitutional: No fever, No chills, No sweats, No weakness, No fatigue  Respiratory: No shortness of breath, No cough, No sputum production, No hemoptysis, No wheezing, No cyanosis.  Cardiovascular: No chest pain, No palpitations, No bradycardia, No tachycardia, No peripheral edema, No syncope.  Gastrointestinal: No nausea, No vomiting, No diarrhea, No constipation, No heartburn, No abdominal pain.  Endocrine: No excessive thirst, No polyuria, No cold intolerance, No heat intolerance, No excessive hunger.  Immunologic: No immunocompromised, No recurrent fevers, No recurrent infections.  Musculoskeletal: No back pain, No neck pain, No joint pain, No muscle pain, No  claudication, + decreased range of motion, No trauma.  Integumentary: No rash, No pruritus, No abrasions.  Neurologic: Alert and oriented X4, No abnormal balance, No headache, No confusion, No numbness, No tingling.  Psychiatric: No anxiety, No depression, No lizabeth.       Objective:     Patient Vitals for the past 8 hrs:   BP Temp Temp src Pulse Resp SpO2 Height Weight   24 0917 (!) 109/95 98.4 °F (36.9 °C) Oral 71 17 99 % 1.575 m (5' 2\") 59.5 kg (131 lb 2.8 oz)     Temp (24hrs), Av.4 °F (36.9 °C), Min:98.4 °F (36.9 °C), Max:98.4 °F (36.9 °C)      Physical Exam:    GEN: Pt is AAOx4 and in NAD. No family noted at BS  RESP: Breathing is non labored with symmetrical chest wall expansion  CARDIO: No JVD, Normal rate, Normal rhythm  DERM: No wounds noted. Skin intact and supple  VASC: Pedal pulses (DP/PT) palpable to B/L LE. CFT<3sec to all digits of B/L LE. Pedal hair growth noted to the level of the digits for B/L LE. Skin temp is warm to warm from proximal to distal for B/L LE. Neg homans/ann-marie signs to B/L LE. No varicosities or telangectasias noted to B/L LE.  NEURO: Protective and epicritic sensations grossly intact to B/L LE  MSK: (-) POP. Angular deformities of toes 1-5 to the left foot. Good muscle tone and bulk noted to B/L LE.  PSYCH: Cooperative with normal mood and affect       Lab Review: No results found for this or any previous visit (from the past 24 hour(s)).      Image Review:            Impression:       Angular deformity, left great toe  Angular deformity, left second toe  Left foot pain      Recommendation:     Pt seen in pre op holding  - All questions answered and concerns addressed  - NPO since midnight  - Consent signed/witnessed  - Ancef 2g order for surgical prophylaxis  - No changes noted today from her preop H&P exam or findings (performed 2024)  - I will follow closely during the post op period as outpatient     Thank you!        Foreign A. Phillingane, CHAD, CWSP,

## 2024-04-05 NOTE — OP NOTE
[facility] PODIATRY & FOOT SURGERY    Operative Note      Patient: Kimmie Jamil  YOB: 1955  MRN: [id number]    Date of Procedure:   04/5/2024    Pre-Op Diagnosis Codes:     * Acquired cavovarus deformity of foot, left [M21.6X2]    Post-Op Diagnosis: {MH OR SAME:315936889}       Procedure(s):  CORRECTION OF ANGULAR TOE DEFORMITY TO LEFT GREAT AND SECOND TOE    Surgeon(s):  Foreign Oates DPM    Assistant:   * No surgical staff found *    Anesthesia: Monitor Anesthesia Care    Estimated Blood Loss (mL): {NUMBERS; EBL:12594}    Complications: {Symptoms; Intra-op complications:93313}    Specimens:   * No specimens in log *    Implants:  * No implants in log *      Drains: * No LDAs found *    Findings:  Infection Present At Time Of Surgery (PATOS) (choose all levels that have infection present):  {PATOS LEVELS:782093576}  Other Findings: ***    Detailed Description of Procedure:   ***    Electronically signed by Foreign Oates DPM on 4/5/2024 at 11:23 AM

## 2024-04-05 NOTE — FLOWSHEET NOTE
04/05/24 1125   Handoff   Communication Given Periop Handoff/Relief   Handoff phase Phase I receiving   Handoff Given To Alvino Wilson RN   Handoff Received From Lexi Horn RN   Handoff Communication Face to Face;At bedside   Time Handoff Given 1125     Report from Cat Falcon CRNA

## 2024-04-05 NOTE — DISCHARGE INSTRUCTIONS
Keep dressing clean/dry/intact  Ambulate with a post op shoe  Elevated for pain/swelling reduction  Percocet 5-325mg for acute post op oral analgesia  Follow up with established appt  Call the on call number as needed    Thank you!

## 2024-04-05 NOTE — ANESTHESIA PRE PROCEDURE
Department of Anesthesiology  Preprocedure Note       Name:  Kimmie Jamil   Age:  68 y.o.  :  1955                                          MRN:  [id number]         Date:  2024      Surgeon: Surgeon(s):  Foreign Oates DPM    Procedure: Procedure(s):  CORRECTION OF ANGULAR TOE DEFORMITY TO LEFT SECOND, THIRD AND FOURTH TOE    Medications prior to admission:   Prior to Admission medications    Medication Sig Start Date End Date Taking? Authorizing Provider   COLLAGEN PO Take by mouth daily    Sonia Shaikh MD   cetirizine (ZYRTEC) 10 MG tablet Take 1 tablet by mouth daily    Sonia Shaikh MD   Biotin 1 MG CAPS Take 1 capsule by mouth    Sonia Shaikh MD   Multiple Vitamins-Minerals (THERAPEUTIC MULTIVITAMIN-MINERALS) tablet Take 1 tablet by mouth daily    Sonia Shaikh MD   Estradiol-Norethindrone Acet 0.5-0.1 MG TABS Take 1 tablet by mouth daily    Sonia Shakih MD   niacinamide 500 MG tablet Take 1 tablet by mouth 2 times daily (with meals)  Patient not taking: Reported on 2024    Sonia Shaikh MD   cadexomer iodine (IODOSORB) 0.9 % gel Apply topically daily as needed.  Patient not taking: Reported on 2023   Foreign Oates DPM   citalopram (CELEXA) 20 MG tablet Take 1 tablet by mouth daily    Automatic Reconciliation, Ar   diclofenac (VOLTAREN) 75 MG EC tablet Take by mouth 2 times daily    Automatic Reconciliation, Ar   gabapentin (NEURONTIN) 300 MG capsule Take by mouth 3 times daily.    Automatic Reconciliation, Ar   loratadine (CLARITIN) 10 MG tablet Take 1 tablet by mouth daily  Patient not taking: Reported on 2024    Automatic Reconciliation, Ar       Current medications:    Current Facility-Administered Medications   Medication Dose Route Frequency Provider Last Rate Last Admin    alcohol 62% (NOZIN) nasal  3 ampule  3 ampule Nasal Once Foreign Oates DPM           Allergies:  No Known

## 2024-04-05 NOTE — ANESTHESIA POSTPROCEDURE EVALUATION
Department of Anesthesiology  Postprocedure Note    Patient: Kimmie Jamil  MRN: [id number]  YOB: 1955  Date of evaluation: 4/5/2024    Procedure Summary       Date: 04/05/24 Room / Location: \[facility]\"" MAIN OR  / \[facility]\"" MAIN OR    Anesthesia Start: 1031 Anesthesia Stop: 1127    Procedure: CORRECTION OF ANGULAR TOE DEFORMITY TO LEFT GREAT AND SECOND TOE (Left: Foot) Diagnosis:       Acquired cavovarus deformity of foot, left      (Acquired cavovarus deformity of foot, left [M21.6X2])    Providers: Foreign Oates DPM Responsible Provider: Darion Donahue MD    Anesthesia Type: MAC ASA Status: 2            Anesthesia Type: MAC    Cordell Phase I: Cordell Score: 10    Cordell Phase II:      Anesthesia Post Evaluation    Patient location during evaluation: PACU  Patient participation: complete - patient participated  Level of consciousness: sleepy but conscious  Airway patency: patent  Nausea & Vomiting: no nausea and no vomiting  Cardiovascular status: hemodynamically stable  Respiratory status: acceptable and room air  Hydration status: stable  Multimodal analgesia pain management approach    No notable events documented.

## 2024-04-08 NOTE — PROGRESS NOTES
[facility] PODIATRY & FOOT SURGERY      Subjective:         Patient is a 68 y.o. female who is being seen as a returning patient to discuss treatment options regarding her left foot pain.  She has undergone multiple surgeries by a local orthopedist.  She has resulting pain.  States pain rest level of 4 out of 10 and is exacerbated with activity and shoe gear.  Denies any recent trauma.  Denies any local/systemic signs of infection.  States that she does have x-rays to review.  Denies any other lower extremity complaints      Past Medical History:   Diagnosis Date    Anxiety     Arthritis     Cancer (HCC)     basal on face    H/O seasonal allergies      Past Surgical History:   Procedure Laterality Date    APPENDECTOMY  age 10    CATARACT REMOVAL Bilateral     COLONOSCOPY      DILATION AND CURETTAGE OF UTERUS      HAMMER TOE SURGERY Left 4/5/2024    CORRECTION OF ANGULAR TOE DEFORMITY TO LEFT GREAT AND SECOND TOE performed by Foreign Oates DPM at [facility] MAIN OR    TOTAL KNEE ARTHROPLASTY Right 10/25/2023       Family History   Problem Relation Age of Onset    Stroke Mother     Pneumonia Father       Social History     Tobacco Use    Smoking status: Every Day     Current packs/day: 0.50     Average packs/day: 0.5 packs/day for 55.3 years (27.6 ttl pk-yrs)     Types: Cigarettes     Start date: 1969    Smokeless tobacco: Never   Substance Use Topics    Alcohol use: Yes     Comment: socially     No Known Allergies  Prior to Admission medications    Medication Sig Start Date End Date Taking? Authorizing Provider   Biotin 1 MG CAPS Take 1 capsule by mouth   Yes Sonia Shaikh MD   Multiple Vitamins-Minerals (THERAPEUTIC MULTIVITAMIN-MINERALS) tablet Take 1 tablet by mouth daily   Yes Sonia Shaikh MD   Estradiol-Norethindrone Acet 0.5-0.1 MG TABS Take 1 tablet by mouth daily   Yes Sonia Shaikh MD   niacinamide 500 MG tablet Take 1 tablet by mouth 2 times daily (with

## 2024-04-23 ENCOUNTER — HOSPITAL ENCOUNTER (OUTPATIENT)
Facility: HOSPITAL | Age: 69
Discharge: HOME | End: 2024-04-23
Attending: PODIATRIST
Payer: MEDICARE

## 2024-04-23 VITALS
SYSTOLIC BLOOD PRESSURE: 103 MMHG | TEMPERATURE: 97.8 F | HEART RATE: 71 BPM | RESPIRATION RATE: 18 BRPM | DIASTOLIC BLOOD PRESSURE: 56 MMHG

## 2024-04-23 DIAGNOSIS — L97.522 CHRONIC FOOT ULCER WITH FAT LAYER EXPOSED, LEFT (HCC): Primary | ICD-10-CM

## 2024-04-23 PROCEDURE — 99213 OFFICE O/P EST LOW 20 MIN: CPT

## 2024-04-23 RX ORDER — SODIUM CHLOR/HYPOCHLOROUS ACID 0.033 %
SOLUTION, IRRIGATION IRRIGATION ONCE
OUTPATIENT
Start: 2024-04-23 | End: 2024-04-23

## 2024-04-23 RX ORDER — TRIAMCINOLONE ACETONIDE 1 MG/G
OINTMENT TOPICAL ONCE
OUTPATIENT
Start: 2024-04-23 | End: 2024-04-23

## 2024-04-23 RX ORDER — GENTAMICIN SULFATE 1 MG/G
OINTMENT TOPICAL ONCE
OUTPATIENT
Start: 2024-04-23 | End: 2024-04-23

## 2024-04-23 RX ORDER — LIDOCAINE HYDROCHLORIDE 40 MG/ML
SOLUTION TOPICAL ONCE
OUTPATIENT
Start: 2024-04-23 | End: 2024-04-23

## 2024-04-23 RX ORDER — LIDOCAINE 40 MG/G
CREAM TOPICAL ONCE
OUTPATIENT
Start: 2024-04-23 | End: 2024-04-23

## 2024-04-23 RX ORDER — LIDOCAINE 50 MG/G
OINTMENT TOPICAL ONCE
OUTPATIENT
Start: 2024-04-23 | End: 2024-04-23

## 2024-04-23 RX ORDER — BETAMETHASONE DIPROPIONATE 0.05 %
OINTMENT (GRAM) TOPICAL ONCE
OUTPATIENT
Start: 2024-04-23 | End: 2024-04-23

## 2024-04-23 RX ORDER — LIDOCAINE HYDROCHLORIDE 20 MG/ML
JELLY TOPICAL ONCE
OUTPATIENT
Start: 2024-04-23 | End: 2024-04-23

## 2024-04-23 RX ORDER — GINSENG 100 MG
CAPSULE ORAL ONCE
OUTPATIENT
Start: 2024-04-23 | End: 2024-04-23

## 2024-04-23 RX ORDER — IBUPROFEN 200 MG
TABLET ORAL ONCE
OUTPATIENT
Start: 2024-04-23 | End: 2024-04-23

## 2024-04-23 RX ORDER — CLOBETASOL PROPIONATE 0.5 MG/G
OINTMENT TOPICAL ONCE
OUTPATIENT
Start: 2024-04-23 | End: 2024-04-23

## 2024-04-23 RX ORDER — BACITRACIN ZINC AND POLYMYXIN B SULFATE 500; 1000 [USP'U]/G; [USP'U]/G
OINTMENT TOPICAL ONCE
OUTPATIENT
Start: 2024-04-23 | End: 2024-04-23

## 2024-04-23 NOTE — FLOWSHEET NOTE
04/23/24 1339   Incision 04/05/24 Toe (Comment  which one) Left   Date First Assessed/Time First Assessed: 04/05/24 1048   Present on Original Admission: No  Location: Toe (Comment  which one)  Incision Location Orientation: Left  Incision Description (Comments): Great toe and second toe   Dressing Status New dressing applied   Dressing/Treatment Moist to dry;Gauze dressing/dressing sponge;Roll gauze;Tape/Soft cloth adhesive tape;Ace wrap   Incision Assessment   (suture removed)

## 2024-04-23 NOTE — FLOWSHEET NOTE
04/23/24 1311   Incision 04/05/24 Toe (Comment  which one) Left   Date First Assessed/Time First Assessed: 04/05/24 1048   Present on Original Admission: No  Location: Toe (Comment  which one)  Incision Location Orientation: Left  Incision Description (Comments): Great toe and second toe   Wound Image    Dressing Status Old drainage noted  (removed by MD)   Incision Length (cm) 0.1   Incision Width (cm) 0.1 cm   Incision Depth (cm) 0.1 cm   Incision Volume (cm^3) 0 cm^3   Incision Assessment Other (Comment)  (Pin noted)   Drainage Amount Moderate (25-50%)   Drainage Description Serosanguinous  (old drainage)   Odor None   Pain Assessment   Pain Assessment None - Denies Pain     BP (!) 103/56   Pulse 71   Temp 97.8 °F (36.6 °C)   Resp 18   LMP  (LMP Unknown)

## 2024-04-23 NOTE — DISCHARGE INSTRUCTIONS
Discharge Instructions [facility]  [address]   [address]  [place] [address]   Telephone: [phone]     FAX [phone]    NAME:  Kimmie Jamil  YOB: 1955  MEDICAL RECORD NUMBER:  [id number]  DATE:  4/23/2024    WOUND CARE ORDERS:  Left 2 nd toe dressing : Apply primary dressing Betadine moist to dry  cover with secondary dressing Gauze, Roll Gauze, and Tape .  Apply Ace wrap . Remove the dressing on Thursday.No dressing required. F/U in 4 week.   Avoid weight bearing on the left foot and minimize the activity.  [] Wound culture send  [] Antibiotic prescribe to the pharmacy   Home Health Agency: None    If you are still having pain after you go home:  [x] Elevate the affected limb.   [x] Use over-the-counter medications you would normally use for pain as permitted by your family doctor.  [] For persistent pain not relieved by the above interventions, please call your family doctor.     TREATMENT ORDERS:    Elevate leg(s) above the level of the heart when sitting.   Avoid prolonged standing in one place.  Do no get dressing/wrap wet.  Follow Diet as prescribed:   [x] Diet as tolerated: [] Calorie Diabetic Diet: Low carb and no Sugar [x] No Added Salt:  [] Increase Protein: [] Limit the amount of liquid you are drinking and avoid drinking in between meals     Return Appointment:  [x] Return Appointment: With Dr. Oates  in  4 Week(s)      Electronically signed REBEKA PARHAM RN on 4/23/2024 at 1:31 PM     Wound Care Center Information: Should you experience any significant changes in your wound(s) or have questions about your wound care, please contact the [facility] at MONDAY - FRIDAY 8:00 am - 4:30.  If you need help with your wound outside these hours and cannot wait until we are again available, contact your PCP or go to the hospital emergency room.   PLEASE NOTE: IF YOU ARE UNABLE TO OBTAIN WOUND SUPPLIES, CONTINUE TO USE THE

## 2024-05-21 ENCOUNTER — HOSPITAL ENCOUNTER (OUTPATIENT)
Facility: HOSPITAL | Age: 69
Discharge: HOME | End: 2024-05-21
Attending: PODIATRIST
Payer: MEDICARE

## 2024-05-21 VITALS
RESPIRATION RATE: 18 BRPM | DIASTOLIC BLOOD PRESSURE: 51 MMHG | TEMPERATURE: 97.2 F | SYSTOLIC BLOOD PRESSURE: 108 MMHG | HEART RATE: 74 BPM

## 2024-05-21 DIAGNOSIS — L97.522 CHRONIC FOOT ULCER WITH FAT LAYER EXPOSED, LEFT (HCC): Primary | ICD-10-CM

## 2024-05-21 PROCEDURE — 99211 OFF/OP EST MAY X REQ PHY/QHP: CPT

## 2024-05-21 RX ORDER — LIDOCAINE 50 MG/G
OINTMENT TOPICAL ONCE
Status: CANCELLED | OUTPATIENT
Start: 2024-05-21 | End: 2024-05-21

## 2024-05-21 RX ORDER — BETAMETHASONE DIPROPIONATE 0.05 %
OINTMENT (GRAM) TOPICAL ONCE
Status: CANCELLED | OUTPATIENT
Start: 2024-05-21 | End: 2024-05-21

## 2024-05-21 RX ORDER — LIDOCAINE HYDROCHLORIDE 40 MG/ML
SOLUTION TOPICAL ONCE
Status: CANCELLED | OUTPATIENT
Start: 2024-05-21 | End: 2024-05-21

## 2024-05-21 RX ORDER — GENTAMICIN SULFATE 1 MG/G
OINTMENT TOPICAL ONCE
Status: CANCELLED | OUTPATIENT
Start: 2024-05-21 | End: 2024-05-21

## 2024-05-21 RX ORDER — CLOBETASOL PROPIONATE 0.5 MG/G
OINTMENT TOPICAL ONCE
Status: CANCELLED | OUTPATIENT
Start: 2024-05-21 | End: 2024-05-21

## 2024-05-21 RX ORDER — LIDOCAINE HYDROCHLORIDE 20 MG/ML
JELLY TOPICAL ONCE
Status: CANCELLED | OUTPATIENT
Start: 2024-05-21 | End: 2024-05-21

## 2024-05-21 RX ORDER — TRIAMCINOLONE ACETONIDE 1 MG/G
OINTMENT TOPICAL ONCE
Status: CANCELLED | OUTPATIENT
Start: 2024-05-21 | End: 2024-05-21

## 2024-05-21 RX ORDER — SODIUM CHLOR/HYPOCHLOROUS ACID 0.033 %
SOLUTION, IRRIGATION IRRIGATION ONCE
Status: CANCELLED | OUTPATIENT
Start: 2024-05-21 | End: 2024-05-21

## 2024-05-21 RX ORDER — LIDOCAINE 40 MG/G
CREAM TOPICAL ONCE
Status: CANCELLED | OUTPATIENT
Start: 2024-05-21 | End: 2024-05-21

## 2024-05-21 RX ORDER — GINSENG 100 MG
CAPSULE ORAL ONCE
Status: CANCELLED | OUTPATIENT
Start: 2024-05-21 | End: 2024-05-21

## 2024-05-21 RX ORDER — IBUPROFEN 200 MG
TABLET ORAL ONCE
Status: CANCELLED | OUTPATIENT
Start: 2024-05-21 | End: 2024-05-21

## 2024-05-21 RX ORDER — BACITRACIN ZINC AND POLYMYXIN B SULFATE 500; 1000 [USP'U]/G; [USP'U]/G
OINTMENT TOPICAL ONCE
Status: CANCELLED | OUTPATIENT
Start: 2024-05-21 | End: 2024-05-21

## 2024-05-21 NOTE — FLOWSHEET NOTE
05/21/24 1325   RLE Neurovascular Assessment   Capillary Refill Less than/Equal to 3 seconds   Color Appropriate for Ethnicity   Temperature Warm   R Pedal Pulse +2   Incision 04/05/24 Toe (Comment  which one) Left   Date First Assessed/Time First Assessed: 04/05/24 1048   Present on Original Admission: No  Location: Toe (Comment  which one)  Incision Location Orientation: Left  Incision Description (Comments): Great toe and second toe   Wound Image    Incision Length (cm) 0   Incision Width (cm) 0 cm   Incision Depth (cm) 0 cm   Incision Volume (cm^3) 0 cm^3   Drainage Amount None (dry)   Odor None   Pain Assessment   Pain Assessment None - Denies Pain     BP (!) 108/51   Pulse 74   Temp 97.2 °F (36.2 °C) (Temporal)   Resp 18   LMP  (LMP Unknown)

## 2024-05-21 NOTE — DISCHARGE INSTRUCTIONS
Discharge Instructions/Wound Care Orders  [facility]  [address]   [address]  [place] [address]   Telephone: [phone]    FAX [phone]      NAME:  Kimmie Jamil  YOB: 1955  MEDICAL RECORD NUMBER:  [id number]  DATE:  5/21/2024    CPT code: E&M-Level 1 (90368)    Congratulations!  You have completed your treatment.     Return to your Primary Care Physician for all your health issues.   Resume your ordinary activities as tolerated.   Take your medications as prescribed by your primary care physician.   Check your skin daily for cracks, bruises, sores, or dryness. Use a moisturizer as needed.   Clean and dry your skin, using mild soap and warm water (not hot).   Avoid alcohol and caffeine and do not smoke.  Maintain a nutritious diet.  Avoid pressure on your wound site. Keep your legs elevated above the level of the heart whenever possible.    THANK YOU FOR ALLOWING US TO SERVE YOU.  PLEASE CALL IF YOU DEVELOP ANOTHER WOUND.     Electronically signed by REBEKA PARHAM RN on 5/21/2024 at 1:51 PM     Wound Care Center Information: Should you experience any significant changes in your wound(s) or have questions about your wound care, please contact the [facility] at MONDAY - FRIDAY 8:00 am - 4:30.  If you need help with your wound outside these hours and cannot wait until we are again available, contact your PCP or go to the hospital emergency room.     PLEASE NOTE: IF YOU ARE UNABLE TO OBTAIN WOUND SUPPLIES, CONTINUE TO USE THE SUPPLIES YOU HAVE AVAILABLE UNTIL YOU ARE ABLE TO REACH US. IT IS MOST IMPORTANT TO KEEP THE WOUND COVERED AT ALL TIMES.     Physician Signature:_______________________    Date: ___________ Time:  ____________

## 2024-11-05 ENCOUNTER — HOSPITAL ENCOUNTER (OUTPATIENT)
Facility: HOSPITAL | Age: 69
Discharge: HOME | End: 2024-11-05
Attending: PODIATRIST
Payer: MEDICARE

## 2024-11-05 VITALS
TEMPERATURE: 97.8 F | RESPIRATION RATE: 18 BRPM | HEART RATE: 74 BPM | DIASTOLIC BLOOD PRESSURE: 56 MMHG | SYSTOLIC BLOOD PRESSURE: 105 MMHG

## 2024-11-05 DIAGNOSIS — L89.892 PRESSURE ULCER OF RIGHT FOOT, STAGE 2 (HCC): Primary | ICD-10-CM

## 2024-11-05 PROCEDURE — 99213 OFFICE O/P EST LOW 20 MIN: CPT

## 2024-11-05 RX ORDER — BETAMETHASONE DIPROPIONATE 0.5 MG/G
CREAM TOPICAL ONCE
OUTPATIENT
Start: 2024-11-05 | End: 2024-11-05

## 2024-11-05 RX ORDER — LIDOCAINE HYDROCHLORIDE 20 MG/ML
JELLY TOPICAL ONCE
OUTPATIENT
Start: 2024-11-05 | End: 2024-11-05

## 2024-11-05 RX ORDER — BACITRACIN ZINC AND POLYMYXIN B SULFATE 500; 1000 [USP'U]/G; [USP'U]/G
OINTMENT TOPICAL ONCE
OUTPATIENT
Start: 2024-11-05 | End: 2024-11-05

## 2024-11-05 RX ORDER — NEOMYCIN/BACITRACIN/POLYMYXINB 3.5-400-5K
OINTMENT (GRAM) TOPICAL ONCE
OUTPATIENT
Start: 2024-11-05 | End: 2024-11-05

## 2024-11-05 RX ORDER — LIDOCAINE 50 MG/G
OINTMENT TOPICAL ONCE
OUTPATIENT
Start: 2024-11-05 | End: 2024-11-05

## 2024-11-05 RX ORDER — SODIUM CHLOR/HYPOCHLOROUS ACID 0.033 %
SOLUTION, IRRIGATION IRRIGATION ONCE
OUTPATIENT
Start: 2024-11-05 | End: 2024-11-05

## 2024-11-05 RX ORDER — TRIAMCINOLONE ACETONIDE 1 MG/G
OINTMENT TOPICAL ONCE
OUTPATIENT
Start: 2024-11-05 | End: 2024-11-05

## 2024-11-05 RX ORDER — GINSENG 100 MG
CAPSULE ORAL ONCE
OUTPATIENT
Start: 2024-11-05 | End: 2024-11-05

## 2024-11-05 RX ORDER — MUPIROCIN 20 MG/G
OINTMENT TOPICAL ONCE
OUTPATIENT
Start: 2024-11-05 | End: 2024-11-05

## 2024-11-05 RX ORDER — LIDOCAINE HYDROCHLORIDE 40 MG/ML
SOLUTION TOPICAL ONCE
OUTPATIENT
Start: 2024-11-05 | End: 2024-11-05

## 2024-11-05 RX ORDER — LIDOCAINE 40 MG/G
CREAM TOPICAL ONCE
OUTPATIENT
Start: 2024-11-05 | End: 2024-11-05

## 2024-11-05 RX ORDER — GENTAMICIN SULFATE 1 MG/G
OINTMENT TOPICAL ONCE
OUTPATIENT
Start: 2024-11-05 | End: 2024-11-05

## 2024-11-05 RX ORDER — CLOBETASOL PROPIONATE 0.5 MG/G
OINTMENT TOPICAL ONCE
OUTPATIENT
Start: 2024-11-05 | End: 2024-11-05

## 2024-11-05 RX ORDER — SILVER SULFADIAZINE 10 MG/G
CREAM TOPICAL ONCE
OUTPATIENT
Start: 2024-11-05 | End: 2024-11-05

## 2024-11-05 NOTE — DISCHARGE INSTRUCTIONS
Discharge Instructions [facility]  [address]   [address]  [place] [address]   Telephone: [phone]     FAX [phone]    NAME:  Kimmie Jamil  YOB: 1955  MEDICAL RECORD NUMBER:  [id number]  DATE:  11/5/2024    CPT code:E&M-Level 3 (95455)    WOUND CARE ORDERS:  Right great toe  :Cleanse with soap and water , apply primary dressing Paint with Betadine  covered with secondary dressing Fabric Band-Aid .  Pt./pcg/HH nurse to change (freq) Daily and as needed for compromise. No follow up at this time.   Home Health Agency: none  TREATMENT ORDERS:    Elevate leg(s) above the level of the heart when sitting.   Avoid prolonged standing in one place.  Do no get dressing/wrap wet.  Follow Diet as prescribed:   [] Diet as tolerated: [] Calorie Diabetic Diet: Low carb and no Sugar [] No Added Salt:no more then 2,000 mg daily  [] Increase Protein: [] Limit the amount of liquid you are drinking and avoid drinking in between meals (limit to 2 quarts daily)     Return Appointment:  [x] Return Appointment: With Dr. Oates in  no follow up at this time.   Patient should be scheduled for surgery per Dr. Oates   [] Nurse Visit : *** days  [x] Ordered tests: X-ray   Electronically signed Karly Marx RN on 11/5/2024 at 1:44 PM     Wound Care Center Information: Should you experience any significant changes in your wound(s) or have questions about your wound care, please contact the [facility] at MONDAY - FRIDAY 8:00 am - 4:30.  If you need help with your wound outside these hours and cannot wait until we are again available, contact your PCP or go to the hospital emergency room.   PLEASE NOTE: IF YOU ARE UNABLE TO OBTAIN WOUND SUPPLIES, CONTINUE TO USE THE SUPPLIES YOU HAVE AVAILABLE UNTIL YOU ARE ABLE TO REACH US. IT IS MOST IMPORTANT TO KEEP THE WOUND COVERED AT ALL TIMES.     Physician Signature:_______________________    Date:

## 2024-11-05 NOTE — FLOWSHEET NOTE
11/05/24 1315   Wound 11/05/24 Toe (Comment  which one) Right between great toe and 2nd toe   Date First Assessed/Time First Assessed: 11/05/24 1316   Primary Wound Type: Pressure Injury  Location: Toe (Comment  which one)  Wound Location Orientation: Right  Wound Description (Comments): between great toe and 2nd toe   Wound Image    Wound Etiology Pressure Stage 3   Dressing Status Old drainage noted   Wound Cleansed Cleansed with saline   Wound Length (cm) 0.3 cm   Wound Width (cm) 0.7 cm   Wound Depth (cm) 0.1 cm   Wound Surface Area (cm^2) 0.21 cm^2   Wound Volume (cm^3) 0.021 cm^3   Wound Assessment Leroy/red;Slough   Drainage Amount Moderate (25-50%)   Drainage Description Serous   Odor None   Noemi-wound Assessment Fragile   Margins Flat/open edges   Wound Thickness Description not for Pressure Injury Full thickness   Pain Assessment   Pain Assessment None - Denies Pain       BP (!) 105/56   Pulse 74   Temp 97.8 °F (36.6 °C) (Temporal)   Resp 18   LMP  (LMP Unknown)

## 2024-11-05 NOTE — PROGRESS NOTES
\"MCV\", \"RDW\", \"PLT\" in the last 72 hours.  CHEMISTRIES:No results for input(s): \"NA\", \"K\", \"CL\", \"CO2\", \"BUN\", \"CREATININE\", \"GLUCOSE\", \"PHOS\", \"MG\" in the last 72 hours.    Invalid input(s): \"CA\"  PT/INR:No results for input(s): \"PROTIME\", \"INR\" in the last 72 hours.  APTT:No results for input(s): \"APTT\" in the last 72 hours.  LIVER PROFILE:No results for input(s): \"AST\", \"ALT\", \"BILIDIR\", \"BILITOT\", \"ALKPHOS\" in the last 72 hours.    Imaging Last 24 Hours:  No results found.      Assessment//Plan           Assessment & Plan    Right foot ulcer  Angular deformity of right second toe        Patient seen and evaluated at bedside  - Rx given for XR of the right foot  - Tx options reviewed, conservative vs surgical. Possible complications discussed. Pt has elected for surgical intervention. In depth convo had regarding preop, intraop and postop surgical protocols. Once XR performed, will call with final surgical plan  - Local wound care performed. Home wound care orders given  - Will monitor off abx at this time  - RTC for post op carte    Thank you!              Foreign Oates DPM, CWSP, AACFAS    [facility]  [address] [place] [address], [place] [address] [phone]     [facility]  [address], [address]  [place] [address] [phone]     [facility]  [address] [facility] [address]  [place] [address] [phone]     * Available via [employer] 24/7        Electronically signed by Foreign Oates DPM on 11/5/24 at 1:51 PM EST

## 2024-11-07 ENCOUNTER — HOSPITAL ENCOUNTER (OUTPATIENT)
Facility: HOSPITAL | Age: 69
Discharge: HOME | End: 2024-11-10
Payer: MEDICARE

## 2024-11-07 DIAGNOSIS — M79.671 RIGHT FOOT PAIN: ICD-10-CM

## 2024-11-07 DIAGNOSIS — M79.671 RIGHT FOOT PAIN: Primary | ICD-10-CM

## 2024-11-07 PROCEDURE — 73630 X-RAY EXAM OF FOOT: CPT

## 2024-12-16 NOTE — PERIOP NOTE
[facility]  Preoperative Instructions        Surgery Date: 12/20/24          Time of Arrival: To Be Called (day prior between 2-5pm)  Contact: [phone]      On the day of your surgery, please report to Surgical Services Registration Desk and sign in at your designated time.  The Surgery Center is located to the right of the Emergency Room.     2. You must have someone with you to drive you home. You should not drive a car for 24 hours following surgery. Please make arrangements for a friend or family member to stay with you for the first 24 hours after your surgery.    3. Do not have anything to eat or drink (including water, gum, mints, coffee, juice) after midnight Thursday December 19th.?This may not apply to medications prescribed by your physician. ?(Please note below the special instructions with medications to take the morning of your procedure.)    4. We recommend you do not drink any alcoholic beverages for 24 hours before and after your surgery.    5. Contact your surgeon's office for instructions on the following medications: non-steroidal anti-inflammatory drugs (i.e. Advil, Aleve), vitamins, and supplements. (Some surgeon's will want you to stop these medications prior to surgery and others may allow you to take them)  **If you are currently taking Plavix, Coumadin, Aspirin and/or other blood-thinning agents, contact your surgeon for instructions.** Your surgeon will partner with the physician prescribing these medications to determine if it is safe to stop or if you need to continue taking.  Please do not stop taking these medications without instructions from your surgeon    6. Wear comfortable clothes.  Wear glasses instead of contacts.  Do not bring any money or jewelry. Please bring picture ID, insurance card, and any prearranged co-payment or hospital payment.  Do not wear make-up, particularly mascara the morning of your surgery.  Do not wear nail polish, particularly if  you are having foot /hand surgery.  Wear your hair loose or down, no ponytails, buns, jenn pins or clips.  All body piercings must be removed. Please do not shave for 48 hours prior to surgery. Shaving of the face is acceptable. Please see the attached Soap/Hibiclens bathing instructions.    7. You should understand that if you do not follow these instructions your surgery may be cancelled.  If your physical condition changes (I.e. fever, cold or flu) please contact your surgeon as soon as possible.    8. It is important that you be on time.  If a situation occurs where you may be late, please call [phone] (OR Holding Area).    9. If you have any questions and or problems, please call [phone] (Pre-admission Testing).    10. Your surgery time may be subject to change.  You will receive a phone call the evening prior if your time changes.    11.  If having outpatient surgery, you must have someone to drive you here, stay with you during the duration of your stay, and to drive you home at time of discharge.       SPECIAL INSTRUCTIONS:     Follow your physician/surgeon instructions for holding all non-steroidal anti-inflammatory drugs/NSAIDs (diclofenac, Advil, Aleve, etc.) and vitamins & supplements prior to surgery.        TAKE ALL MEDICATIONS DAY OF SURGERY EXCEPT: vitamins/supplements, NSAID'S (diclofenac, Advil, Aleve, etc.)      I understand a pre-operative phone call will be made to verify my surgery time.  In the event that I am not available, I give permission for a message to be left on my answering service and/or with another person?  yes         ___________________      __________   _________    (Signature of Patient)             (Witness)                (Date and Time)

## 2024-12-16 NOTE — PERIOP NOTE
healing and prevent you from healing completely.    If you lose your Hibiclens/chlorhexidine, please call the Surgery Center, or it is available for purchase at your pharmacy.               ___________________      ___________________      ________________  (Signature of Patient)          (Witness)                   (Date and Time)

## 2024-12-19 ENCOUNTER — ANESTHESIA EVENT (OUTPATIENT)
Facility: HOSPITAL | Age: 69
End: 2024-12-19
Payer: MEDICARE

## 2024-12-20 ENCOUNTER — HOSPITAL ENCOUNTER (OUTPATIENT)
Facility: HOSPITAL | Age: 69
Setting detail: OUTPATIENT SURGERY
Discharge: HOME | End: 2024-12-20
Attending: PODIATRIST | Admitting: PODIATRIST
Payer: MEDICARE

## 2024-12-20 ENCOUNTER — ANESTHESIA (OUTPATIENT)
Facility: HOSPITAL | Age: 69
End: 2024-12-20
Payer: MEDICARE

## 2024-12-20 VITALS
SYSTOLIC BLOOD PRESSURE: 134 MMHG | HEIGHT: 62 IN | HEART RATE: 67 BPM | OXYGEN SATURATION: 96 % | RESPIRATION RATE: 11 BRPM | TEMPERATURE: 97.9 F | WEIGHT: 130.07 LBS | BODY MASS INDEX: 23.94 KG/M2 | DIASTOLIC BLOOD PRESSURE: 68 MMHG

## 2024-12-20 DIAGNOSIS — Z98.890 POST-OPERATIVE STATE: Primary | ICD-10-CM

## 2024-12-20 PROCEDURE — 6370000000 HC RX 637 (ALT 250 FOR IP): Performed by: PODIATRIST

## 2024-12-20 PROCEDURE — 3600000003 HC SURGERY LEVEL 3 BASE: Performed by: PODIATRIST

## 2024-12-20 PROCEDURE — 3600000013 HC SURGERY LEVEL 3 ADDTL 15MIN: Performed by: PODIATRIST

## 2024-12-20 PROCEDURE — 7100000000 HC PACU RECOVERY - FIRST 15 MIN: Performed by: PODIATRIST

## 2024-12-20 PROCEDURE — 6360000002 HC RX W HCPCS: Performed by: PODIATRIST

## 2024-12-20 PROCEDURE — 2500000003 HC RX 250 WO HCPCS: Performed by: PODIATRIST

## 2024-12-20 PROCEDURE — 2580000003 HC RX 258: Performed by: ANESTHESIOLOGY

## 2024-12-20 PROCEDURE — 3700000000 HC ANESTHESIA ATTENDED CARE: Performed by: PODIATRIST

## 2024-12-20 PROCEDURE — 7100000001 HC PACU RECOVERY - ADDTL 15 MIN: Performed by: PODIATRIST

## 2024-12-20 PROCEDURE — 7100000011 HC PHASE II RECOVERY - ADDTL 15 MIN: Performed by: PODIATRIST

## 2024-12-20 PROCEDURE — 3700000001 HC ADD 15 MINUTES (ANESTHESIA): Performed by: PODIATRIST

## 2024-12-20 PROCEDURE — 7100000010 HC PHASE II RECOVERY - FIRST 15 MIN: Performed by: PODIATRIST

## 2024-12-20 PROCEDURE — 2709999900 HC NON-CHARGEABLE SUPPLY: Performed by: PODIATRIST

## 2024-12-20 PROCEDURE — 6360000002 HC RX W HCPCS

## 2024-12-20 RX ORDER — OXYCODONE AND ACETAMINOPHEN 5; 325 MG/1; MG/1
1 TABLET ORAL EVERY 6 HOURS PRN
Qty: 12 TABLET | Refills: 0 | Status: SHIPPED | OUTPATIENT
Start: 2024-12-20 | End: 2024-12-23

## 2024-12-20 RX ORDER — OXYCODONE HYDROCHLORIDE 5 MG/1
5 TABLET ORAL
Status: DISCONTINUED | OUTPATIENT
Start: 2024-12-20 | End: 2024-12-20 | Stop reason: HOSPADM

## 2024-12-20 RX ORDER — HYDRALAZINE HYDROCHLORIDE 20 MG/ML
10 INJECTION INTRAMUSCULAR; INTRAVENOUS ONCE
Status: DISCONTINUED | OUTPATIENT
Start: 2024-12-20 | End: 2024-12-20 | Stop reason: HOSPADM

## 2024-12-20 RX ORDER — ONDANSETRON 2 MG/ML
4 INJECTION INTRAMUSCULAR; INTRAVENOUS EVERY 6 HOURS PRN
Status: CANCELLED | OUTPATIENT
Start: 2024-12-20

## 2024-12-20 RX ORDER — ONDANSETRON 2 MG/ML
4 INJECTION INTRAMUSCULAR; INTRAVENOUS
Status: DISCONTINUED | OUTPATIENT
Start: 2024-12-20 | End: 2024-12-20 | Stop reason: HOSPADM

## 2024-12-20 RX ORDER — SODIUM CHLORIDE, SODIUM LACTATE, POTASSIUM CHLORIDE, CALCIUM CHLORIDE 600; 310; 30; 20 MG/100ML; MG/100ML; MG/100ML; MG/100ML
INJECTION, SOLUTION INTRAVENOUS ONCE
Status: COMPLETED | OUTPATIENT
Start: 2024-12-20 | End: 2024-12-20

## 2024-12-20 RX ORDER — SODIUM CHLORIDE 0.9 % (FLUSH) 0.9 %
5-40 SYRINGE (ML) INJECTION EVERY 12 HOURS SCHEDULED
Status: DISCONTINUED | OUTPATIENT
Start: 2024-12-20 | End: 2024-12-20 | Stop reason: HOSPADM

## 2024-12-20 RX ORDER — SODIUM CHLORIDE 0.9 % (FLUSH) 0.9 %
5-40 SYRINGE (ML) INJECTION EVERY 12 HOURS SCHEDULED
Status: CANCELLED | OUTPATIENT
Start: 2024-12-20

## 2024-12-20 RX ORDER — FENTANYL CITRATE 50 UG/ML
INJECTION, SOLUTION INTRAMUSCULAR; INTRAVENOUS
Status: DISCONTINUED | OUTPATIENT
Start: 2024-12-20 | End: 2024-12-20 | Stop reason: SDUPTHER

## 2024-12-20 RX ORDER — LIDOCAINE HYDROCHLORIDE 10 MG/ML
INJECTION, SOLUTION EPIDURAL; INFILTRATION; INTRACAUDAL; PERINEURAL PRN
Status: DISCONTINUED | OUTPATIENT
Start: 2024-12-20 | End: 2024-12-20 | Stop reason: ALTCHOICE

## 2024-12-20 RX ORDER — SODIUM CHLORIDE 0.9 % (FLUSH) 0.9 %
5-40 SYRINGE (ML) INJECTION PRN
Status: DISCONTINUED | OUTPATIENT
Start: 2024-12-20 | End: 2024-12-20 | Stop reason: HOSPADM

## 2024-12-20 RX ORDER — ONDANSETRON 2 MG/ML
INJECTION INTRAMUSCULAR; INTRAVENOUS
Status: DISCONTINUED | OUTPATIENT
Start: 2024-12-20 | End: 2024-12-20 | Stop reason: SDUPTHER

## 2024-12-20 RX ORDER — SODIUM CHLORIDE 0.9 % (FLUSH) 0.9 %
5-40 SYRINGE (ML) INJECTION PRN
Status: CANCELLED | OUTPATIENT
Start: 2024-12-20

## 2024-12-20 RX ORDER — PHENYLEPHRINE HCL IN 0.9% NACL 0.4MG/10ML
SYRINGE (ML) INTRAVENOUS
Status: DISCONTINUED | OUTPATIENT
Start: 2024-12-20 | End: 2024-12-20 | Stop reason: SDUPTHER

## 2024-12-20 RX ORDER — NALOXONE HYDROCHLORIDE 0.4 MG/ML
INJECTION, SOLUTION INTRAMUSCULAR; INTRAVENOUS; SUBCUTANEOUS PRN
Status: DISCONTINUED | OUTPATIENT
Start: 2024-12-20 | End: 2024-12-20 | Stop reason: HOSPADM

## 2024-12-20 RX ORDER — PROCHLORPERAZINE EDISYLATE 5 MG/ML
5 INJECTION INTRAMUSCULAR; INTRAVENOUS
Status: DISCONTINUED | OUTPATIENT
Start: 2024-12-20 | End: 2024-12-20 | Stop reason: HOSPADM

## 2024-12-20 RX ORDER — HYDROMORPHONE HYDROCHLORIDE 1 MG/ML
0.5 INJECTION, SOLUTION INTRAMUSCULAR; INTRAVENOUS; SUBCUTANEOUS EVERY 5 MIN PRN
Status: DISCONTINUED | OUTPATIENT
Start: 2024-12-20 | End: 2024-12-20 | Stop reason: HOSPADM

## 2024-12-20 RX ORDER — PROPOFOL 10 MG/ML
INJECTION, EMULSION INTRAVENOUS
Status: DISCONTINUED | OUTPATIENT
Start: 2024-12-20 | End: 2024-12-20 | Stop reason: SDUPTHER

## 2024-12-20 RX ORDER — ONDANSETRON 4 MG/1
4 TABLET, ORALLY DISINTEGRATING ORAL EVERY 8 HOURS PRN
Status: CANCELLED | OUTPATIENT
Start: 2024-12-20

## 2024-12-20 RX ORDER — SODIUM CHLORIDE 9 MG/ML
INJECTION, SOLUTION INTRAVENOUS PRN
Status: DISCONTINUED | OUTPATIENT
Start: 2024-12-20 | End: 2024-12-20 | Stop reason: HOSPADM

## 2024-12-20 RX ORDER — FENTANYL CITRATE 50 UG/ML
25 INJECTION, SOLUTION INTRAMUSCULAR; INTRAVENOUS EVERY 5 MIN PRN
Status: DISCONTINUED | OUTPATIENT
Start: 2024-12-20 | End: 2024-12-20 | Stop reason: HOSPADM

## 2024-12-20 RX ORDER — SODIUM CHLORIDE 9 MG/ML
INJECTION, SOLUTION INTRAVENOUS PRN
Status: CANCELLED | OUTPATIENT
Start: 2024-12-20

## 2024-12-20 RX ADMIN — ONDANSETRON 4 MG: 2 INJECTION INTRAMUSCULAR; INTRAVENOUS at 11:18

## 2024-12-20 RX ADMIN — SODIUM CHLORIDE, POTASSIUM CHLORIDE, SODIUM LACTATE AND CALCIUM CHLORIDE: 600; 310; 30; 20 INJECTION, SOLUTION INTRAVENOUS at 09:58

## 2024-12-20 RX ADMIN — Medication 80 MCG: at 11:36

## 2024-12-20 RX ADMIN — PROPOFOL 150 MCG/KG/MIN: 10 INJECTION, EMULSION INTRAVENOUS at 11:18

## 2024-12-20 RX ADMIN — WATER 2000 MG: 1 INJECTION INTRAMUSCULAR; INTRAVENOUS; SUBCUTANEOUS at 11:18

## 2024-12-20 RX ADMIN — Medication 80 MCG: at 11:49

## 2024-12-20 RX ADMIN — Medication 40 MCG: at 11:29

## 2024-12-20 RX ADMIN — PROPOFOL 30 MG: 10 INJECTION, EMULSION INTRAVENOUS at 11:20

## 2024-12-20 RX ADMIN — PROPOFOL 20 MG: 10 INJECTION, EMULSION INTRAVENOUS at 11:26

## 2024-12-20 RX ADMIN — Medication 40 MCG: at 11:39

## 2024-12-20 RX ADMIN — FENTANYL CITRATE 50 MCG: 50 INJECTION, SOLUTION INTRAMUSCULAR; INTRAVENOUS at 11:28

## 2024-12-20 RX ADMIN — Medication 3 AMPULE: at 09:58

## 2024-12-20 ASSESSMENT — PAIN - FUNCTIONAL ASSESSMENT: PAIN_FUNCTIONAL_ASSESSMENT: 0-10

## 2024-12-20 NOTE — OP NOTE
Operative Note      Patient: Kimmie Jamil  YOB: 1955  MRN: [id number]    Date of Procedure:   12/20/2024    Pre-Op Diagnosis Codes:   Bone spur, right great toe  Crossover toe, right second toe    Post-Op Diagnosis:   Same       Procedure(s):  Reconstruction, angular deformity of right second toe  Resection, distal condyle of the proximal phalanx of the right great toe    Surgeon(s):  Foreign Oates DPM    Assistant:   None    Anesthesia:   Monitored anesthesia care with local, consisting of 20cc of 1% lidocaine plain    Estimated Blood Loss (mL):   Minimal    Complications:   None    Specimens:   None    Implants:  None      Drains:   None    Findings:  Infection Present At Time Of Surgery (PATOS) (choose all levels that have infection present):  No infection present    Detailed Description of Procedure:   Pt was seen in the pre-operative holding area and all questions were answered and all concerns were addressed. The operative procedures were discussed in great detail, with all possible complications highlighted. Pt verbalized complete understanding and the consent was signed and witnessed. Pt was given ancef 2g for surgical prophylaxis. The operative limb was marked and the pt was transported to the operating room. The pt was transferred to the operating table and anesthesia was administered as indicated above. The RLE was scrubbed and draped in sterile fashion. A time out was performed to confirm the correct pt, correct procedure, correct limb, abx, allergies, fire risk and attendees within the operating room. A tourniquet was applied to the right mid calf and inflated to 225mmHg. Upon completion, procedure #1 commenced.     Procedure #1: Reconstruction, angular deformity of right second toe  With a 15 blade, an incision was made on the dorsal aspect of the right second toe.  The incision was taken down to the level of the metatarsal phalangeal joint.  The joint was sharply released to

## 2024-12-20 NOTE — FLOWSHEET NOTE
12/20/24 1200   Handoff   Communication Given Periop Handoff/Relief   Handoff phase Phase I receiving   Handoff Given To [facility] PACU RN   Handoff Received From Raquel Falcon CRNA   Handoff Communication Face to Face;At bedside   Time Handoff Given 1200     1230: Patient stable for discharge, A/Ox4, stated ready to go home.

## 2024-12-20 NOTE — H&P
PREOP HISTORY & PHYSICAL      Subjective:         Patient is a 68 y.o. female who is being seen for outpatient surgical correction of a right great toe and secnd second toe deformity. She has failed conservative therapy       Patient Active Problem List    Diagnosis Date Noted    Pressure ulcer of right foot, stage 2 (HCC) 11/05/2024    Chronic foot ulcer with fat layer exposed, left (HCC) 08/29/2023     Past Medical History:   Diagnosis Date    Anxiety     Arthritis     Cancer (HCC)     basal on face    H/O seasonal allergies     Nausea     w/ anxiety      Past Surgical History:   Procedure Laterality Date    APPENDECTOMY  age 10    CATARACT REMOVAL Bilateral     COLONOSCOPY      DILATION AND CURETTAGE OF UTERUS      HAMMER TOE SURGERY Left 4/5/2024    CORRECTION OF ANGULAR TOE DEFORMITY TO LEFT GREAT AND SECOND TOE performed by Foreign Oates DPM at [facility]    TOTAL KNEE ARTHROPLASTY Right 10/25/2023      Family History   Problem Relation Age of Onset    Stroke Mother     Pneumonia Father       Social History     Tobacco Use    Smoking status: Every Day     Current packs/day: 0.50     Average packs/day: 0.5 packs/day for 56.0 years (28.0 ttl pk-yrs)     Types: Cigarettes     Start date: 1969    Smokeless tobacco: Never   Substance Use Topics    Alcohol use: Yes     Comment: socially     Current Facility-Administered Medications   Medication Dose Route Frequency Provider Last Rate Last Admin    ceFAZolin (ANCEF) 2,000 mg in sterile water 20 mL IV syringe  2,000 mg IntraVENous Once Foreign Oates DPM          No Known Allergies     Review of Systems:    Constitutional: No fever, No chills, No sweats, No weakness, No fatigue  Respiratory: No shortness of breath, No cough, No sputum production, No hemoptysis, No wheezing, No cyanosis.  Cardiovascular: No chest pain, No palpitations, No bradycardia, No tachycardia, No peripheral edema, No syncope.  Gastrointestinal: No nausea, No vomiting, No

## 2024-12-20 NOTE — ANESTHESIA POSTPROCEDURE EVALUATION
Department of Anesthesiology  Postprocedure Note    Patient: Kimmie Jamil  MRN: [id number]  YOB: 1955  Date of evaluation: 12/20/2024    Procedure Summary       Date: 12/20/24 Room / Location: [facility] MAIN OR M4 / [facility] MAIN OR    Anesthesia Start: 1114 Anesthesia Stop: 1204    Procedure: RESECTION DISTAL END OF PHALANX OF RIGHT GREAT TOE AND RECONSTRUCTION ANGULAR DEFORMITY OF RIGHT SECOND TOE SOFT TISSUE  PROCEDURES ONLY (MAC AND LOCAL) (Right: Foot) Diagnosis:       Hallux varus (acquired), right foot      (Hallux varus (acquired), right foot [M20.31])    Providers: Foreign Oates DPM Responsible Provider: Umair Rodríguez MD    Anesthesia Type: MAC ASA Status: 2            Anesthesia Type: MAC    Cordell Phase I: Cordell Score: 10    Cordell Phase II:      Anesthesia Post Evaluation    Patient location during evaluation: PACU  Patient participation: complete - patient participated  Level of consciousness: responsive to verbal stimuli and sleepy but conscious  Pain score: 2  Airway patency: patent  Cardiovascular status: blood pressure returned to baseline  Respiratory status: acceptable  Hydration status: stable  Comments: +Post-Anesthesia Evaluation and Assessment    Patient: Kimmie Jamil MRN: [id number]  SSN: [id number]   YOB: 1955  Age: 69 y.o.  Sex: female          Cardiovascular Function/Vital Signs    /68   Pulse 67   Temp 97.9 °F (36.6 °C) (Oral)   Resp 11   Ht 1.575 m (5' 2\")   Wt 59 kg (130 lb 1.1 oz)   SpO2 96%   BMI 23.79 kg/m²     Patient is status post Procedure(s):  RESECTION DISTAL END OF PHALANX OF RIGHT GREAT TOE AND RECONSTRUCTION ANGULAR DEFORMITY OF RIGHT SECOND TOE SOFT TISSUE  PROCEDURES ONLY (MAC AND LOCAL).    Nausea/Vomiting: Controlled.    Postoperative hydration reviewed and adequate.    Pain:      Managed.    Neurological Status:       At baseline.    Mental Status and Level of Consciousness: Arousable.    Pulmonary Status:       Adequate

## 2024-12-20 NOTE — DISCHARGE INSTRUCTIONS
your health.    Obesity, smoking, and sedentary lifestyle greatly increases your risk for illness    A healthy diet, regular physical exercise & weight monitoring are important for maintaining a healthy lifestyle    You may be retaining fluid if you have a history of heart failure or if you experience any of the following symptoms:  Weight gain of 3 pounds or more overnight or 5 pounds in a week, increased swelling in our hands or feet or shortness of breath while lying flat in bed.  Please call your doctor as soon as you notice any of these symptoms; do not wait until your next office visit.    A common side effect of anesthesia following surgery is nausea and/or vomiting. In order to decrease symptoms, it is wise to avoid foods that are high in fat, greasy foods, milk products, and spicy foods for the first 24 hours.    Acceptable foods for the first 24 hours following surgery include but are not limited to:    soup  broth  toast   crackers   applesauce  bananas   mashed potatoes,  soft or scrambled eggs  oatmeal  jello    It is important to eat when taking your pain medication. This will help to prevent nausea. If possible, please try to time your meals with your medications.    It is very important to stay hydrated following surgery. Sip fluids frequently while awake. Avoid acidic drinks such as citrus juices and soda for 24 hours. Carbonated beverages may cause bloating and gas. Acceptable fluids include:    water (flavor packets may add variety)  coffee or tea (in moderation)  Gatorade  Johnathan-Aid  apple juice  cranberry juice    You are encouraged to cough and deep breathe every hour when awake. This will help to prevent respiratory complications following anesthesia. You may want to hug a pillow when coughing and sneezing to add additional support to the surgical area and to decrease discomfort if you had abdominal or chest surgery.    If you are discharged home with support stockings, you may remove them  after 24 hours. Support stockings are used to help prevent blood clots in the legs following surgery.    TO PREVENT AN INFECTION      WASH YOUR HANDS    To prevent infection, good handwashing is the most important thing you or your caregiver can do.      Wash your hands with soap and water or use the hand  we gave you before you touch any wounds.    SHOWER    Use the antibacterial soap we gave you when you take a shower.     Shower with this soap until your wounds are healed.      To reach all areas of your body, you may need someone to help you.     Don’t forget to clean your belly button with every shower.     USE CLEAN SHEETS    Use freshly cleaned sheets on your bed after surgery.     To keep the surgery site clean, do not allow pets to sleep with you while your wound is still healing.     STOP SMOKING    Stop smoking, or at least cut back on smoking    Smoking slows your healing.      CONTROL YOUR BLOOD SUGAR    High blood sugars slow wound healing.    If you are diabetic, control your blood sugar levels before and after your surgery.    Please take time to review all of your Home Care Instructions and Medication Information sheets provided in your discharge packet. If you have any questions, please contact your surgeon's office. Thank you.    The discharge information has been reviewed with the patient and instruction recipient.  The patient and instruction recipient verbalized understanding.  Discharge medications reviewed with the patient and instruction recipient and appropriate educational materials and side effects teaching were provided.      Please provide this summary of care documentation to your next provider.

## 2024-12-20 NOTE — ANESTHESIA PRE PROCEDURE
Department of Anesthesiology  Preprocedure Note       Name:  Kimmie Jamil   Age:  69 y.o.  :  1955                                          MRN:  [id number]         Date:  2024      Surgeon: Surgeon(s):  Foreign Oates DPM    Procedure: Procedure(s):  RESECTION DISTAL END OF PHALANX OF EACH TOE, RECONSTRUCTION ANGULAR DEFORMITY OF TOE SOFT TISSUE  PROCEDURES ONLY (MAC AND LOCAL)    Medications prior to admission:   Prior to Admission medications    Medication Sig Start Date End Date Taking? Authorizing Provider   chlordiazePOXIDE-Clidinium (LIBRAX PO) Take by mouth as needed   Yes Sonia Shaikh MD   COLLAGEN PO Take by mouth daily POWDER   Yes Sonia Shaikh MD   cetirizine (ZYRTEC) 10 MG tablet Take 1 tablet by mouth daily   Yes Sonia Shaikh MD   Biotin 1 MG CAPS Take 1 capsule by mouth daily GUMMIES   Yes ProviderSonia MD   Multiple Vitamins-Minerals (THERAPEUTIC MULTIVITAMIN-MINERALS) tablet Take 1 tablet by mouth daily   Yes Sonia Shaikh MD   Estradiol-Norethindrone Acet 0.5-0.1 MG TABS Take 1 tablet by mouth daily   Yes Sonia Shaikh MD   niacinamide 500 MG tablet Take 1 tablet by mouth 2 times daily (with meals)   Yes ProviderSonia MD   citalopram (CELEXA) 20 MG tablet Take 1 tablet by mouth daily   Yes Automatic Reconciliation, Ar   diclofenac (VOLTAREN) 75 MG EC tablet Take by mouth 2 times daily   Yes Automatic Reconciliation, Ar   gabapentin (NEURONTIN) 300 MG capsule Take by mouth 3 times daily.   Yes Automatic Reconciliation, Ar   cadexomer iodine (IODOSORB) 0.9 % gel Apply topically daily as needed.  Patient not taking: Reported on 2023   Foreign Oates DPM       Current medications:    Current Facility-Administered Medications   Medication Dose Route Frequency Provider Last Rate Last Admin   • ceFAZolin (ANCEF) 2,000 mg in sterile water 20 mL IV syringe  2,000 mg IntraVENous Once Foreign Oates DPM

## 2025-01-14 ENCOUNTER — HOSPITAL ENCOUNTER (OUTPATIENT)
Facility: HOSPITAL | Age: 70
Discharge: HOME | End: 2025-01-14
Attending: PODIATRIST
Payer: MEDICARE

## 2025-01-14 VITALS
RESPIRATION RATE: 16 BRPM | DIASTOLIC BLOOD PRESSURE: 56 MMHG | TEMPERATURE: 97.3 F | HEART RATE: 65 BPM | SYSTOLIC BLOOD PRESSURE: 123 MMHG

## 2025-01-14 DIAGNOSIS — L89.892 PRESSURE ULCER OF RIGHT FOOT, STAGE 2 (HCC): Primary | ICD-10-CM

## 2025-01-14 PROCEDURE — 99213 OFFICE O/P EST LOW 20 MIN: CPT

## 2025-01-14 RX ORDER — SODIUM CHLOR/HYPOCHLOROUS ACID 0.033 %
SOLUTION, IRRIGATION IRRIGATION ONCE
OUTPATIENT
Start: 2025-01-14 | End: 2025-01-14

## 2025-01-14 RX ORDER — DOXYCYCLINE 100 MG/1
100 CAPSULE ORAL 2 TIMES DAILY
COMMUNITY
Start: 2025-01-08

## 2025-01-14 RX ORDER — CLOBETASOL PROPIONATE 0.5 MG/G
OINTMENT TOPICAL ONCE
OUTPATIENT
Start: 2025-01-14 | End: 2025-01-14

## 2025-01-14 RX ORDER — GINSENG 100 MG
CAPSULE ORAL ONCE
OUTPATIENT
Start: 2025-01-14 | End: 2025-01-14

## 2025-01-14 RX ORDER — LIDOCAINE HYDROCHLORIDE 20 MG/ML
JELLY TOPICAL ONCE
OUTPATIENT
Start: 2025-01-14 | End: 2025-01-14

## 2025-01-14 RX ORDER — BETAMETHASONE DIPROPIONATE 0.5 MG/G
CREAM TOPICAL ONCE
OUTPATIENT
Start: 2025-01-14 | End: 2025-01-14

## 2025-01-14 RX ORDER — NEOMYCIN/BACITRACIN/POLYMYXINB 3.5-400-5K
OINTMENT (GRAM) TOPICAL ONCE
OUTPATIENT
Start: 2025-01-14 | End: 2025-01-14

## 2025-01-14 RX ORDER — BACITRACIN ZINC AND POLYMYXIN B SULFATE 500; 1000 [USP'U]/G; [USP'U]/G
OINTMENT TOPICAL ONCE
OUTPATIENT
Start: 2025-01-14 | End: 2025-01-14

## 2025-01-14 RX ORDER — SILVER SULFADIAZINE 10 MG/G
CREAM TOPICAL ONCE
OUTPATIENT
Start: 2025-01-14 | End: 2025-01-14

## 2025-01-14 RX ORDER — LIDOCAINE 40 MG/G
CREAM TOPICAL ONCE
OUTPATIENT
Start: 2025-01-14 | End: 2025-01-14

## 2025-01-14 RX ORDER — TRIAMCINOLONE ACETONIDE 1 MG/G
OINTMENT TOPICAL ONCE
OUTPATIENT
Start: 2025-01-14 | End: 2025-01-14

## 2025-01-14 RX ORDER — GENTAMICIN SULFATE 1 MG/G
OINTMENT TOPICAL ONCE
OUTPATIENT
Start: 2025-01-14 | End: 2025-01-14

## 2025-01-14 RX ORDER — MUPIROCIN 20 MG/G
OINTMENT TOPICAL ONCE
OUTPATIENT
Start: 2025-01-14 | End: 2025-01-14

## 2025-01-14 RX ORDER — LIDOCAINE 50 MG/G
OINTMENT TOPICAL ONCE
OUTPATIENT
Start: 2025-01-14 | End: 2025-01-14

## 2025-01-14 RX ORDER — LIDOCAINE HYDROCHLORIDE 40 MG/ML
SOLUTION TOPICAL ONCE
OUTPATIENT
Start: 2025-01-14 | End: 2025-01-14

## 2025-01-14 NOTE — FLOWSHEET NOTE
01/14/25 1410   Wound 11/05/24 Toe (Comment  which one) Right between great toe and 2nd toe   Date First Assessed/Time First Assessed: 11/05/24 1316   Primary Wound Type: Pressure Injury  Location: Toe (Comment  which one)  Wound Location Orientation: Right  Wound Description (Comments): between great toe and 2nd toe   Wound Image    Wound Etiology Pressure Stage 3   Dressing Status Old drainage noted   Wound Cleansed Soap and water   Wound Length (cm) 5 cm   Wound Width (cm) 1 cm   Wound Depth (cm) 0.1 cm   Wound Surface Area (cm^2) 5 cm^2   Change in Wound Size % (l*w) -2280.95   Wound Volume (cm^3) 0.5 cm^3   Wound Healing % -2281   Wound Assessment [place]/red;Slough   Drainage Amount Small (< 25%)   Drainage Description Serosanguinous   Odor None   Noemi-wound Assessment Fragile   Margins Flat/open edges   Wound Thickness Description not for Pressure Injury Full thickness         BP (!) 123/56   Pulse 65   Temp 97.3 °F (36.3 °C) (Temporal)   Resp 16   LMP  (LMP Unknown)

## 2025-01-14 NOTE — DISCHARGE INSTRUCTIONS
Discharge Instructions [facility]  [address]   [address]  [place] [address]   Telephone: [phone]     FAX [phone]    NAME:  Kimmie Jamil  YOB: 1955  MEDICAL RECORD NUMBER:  [id number]  DATE:  1/14/2025    CPT code:E&M-Level 3 (31080)    WOUND CARE ORDERS:  Right foot wound :Cleanse with soap and water , apply primary dressing Betadine moist to dry  covered with secondary dressing Gauze, Roll Gauze, and Tape .  Apply Ace wrap Pt./pcg/HH nurse to change (freq) Every other day  and as needed for compromise.Follow up with provider in 2 Week(s).   Home Health Agency:  none  TREATMENT ORDERS:    Elevate leg(s) above the level of the heart when sitting.   Avoid prolonged standing in one place.  Do no get dressing/wrap wet.  Follow Diet as prescribed:   [] Diet as tolerated: [] Calorie Diabetic Diet: Low carb and no Sugar [] No Added Salt:no more then 2,000 mg daily  [] Increase Protein: [] Limit the amount of liquid you are drinking and avoid drinking in between meals (limit to 2 quarts daily)     Return Appointment:  [x] Return Appointment: With Dr. Oates in  2 Week(s)  [] Nurse Visit : *** days  [] Ordered tests:    Electronically signed Karly Marx RN on 1/14/2025 at 2:31 PM     Wound Care Center Information: Should you experience any significant changes in your wound(s) or have questions about your wound care, please contact the [facility] at MONDAY - FRIDAY 8:00 am - 4:30.  If you need help with your wound outside these hours and cannot wait until we are again available, contact your PCP or go to the hospital emergency room.   PLEASE NOTE: IF YOU ARE UNABLE TO OBTAIN WOUND SUPPLIES, CONTINUE TO USE THE SUPPLIES YOU HAVE AVAILABLE UNTIL YOU ARE ABLE TO REACH US. IT IS MOST IMPORTANT TO KEEP THE WOUND COVERED AT ALL TIMES.     Physician Signature:_______________________    Date: ___________ Time:  ____________

## 2025-07-02 ENCOUNTER — ANESTHESIA EVENT (OUTPATIENT)
Facility: HOSPITAL | Age: 70
End: 2025-07-02
Payer: MEDICARE

## 2025-07-02 ASSESSMENT — LIFESTYLE VARIABLES: SMOKING_STATUS: 1

## 2025-07-02 NOTE — ANESTHESIA PRE PROCEDURE
Department of Anesthesiology  Preprocedure Note       Name:  Kimmie Jamil   Age:  70 y.o.  :  1955                                          MRN:  [id number]         Date:  2025      Surgeon: Surgeon(s):  Blas Vargas MD    Procedure: Procedure(s):  COLONOSCOPY    Medications prior to admission:   Prior to Admission medications    Medication Sig Start Date End Date Taking? Authorizing Provider   doxycycline hyclate (VIBRAMYCIN) 100 MG capsule Take 1 capsule by mouth 2 times daily  Patient not taking: Reported on 2025   Sonia Shaikh MD   COLLAGEN PO Take by mouth daily POWDER    Sonia Shaikh MD   cetirizine (ZYRTEC) 10 MG tablet Take 1 tablet by mouth daily    Sonia Shaikh MD   BIOTIN PO Take 6,000 mg by mouth daily GUMMIES    Sonia Shaikh MD   Multiple Vitamins-Minerals (THERAPEUTIC MULTIVITAMIN-MINERALS) tablet Take 1 tablet by mouth daily    Sonia Shaikh MD   Estradiol-Norethindrone Acet 0.5-0.1 MG TABS Take 1 tablet by mouth daily    Sonia Shaikh MD   niacinamide 500 MG tablet Take 1 tablet by mouth daily    Sonia Shaikh MD   citalopram (CELEXA) 20 MG tablet Take 1 tablet by mouth daily    Automatic Reconciliation, Ar   diclofenac (VOLTAREN) 75 MG EC tablet Take 1 tablet by mouth 2 times daily    Automatic Reconciliation, Ar   gabapentin (NEURONTIN) 300 MG capsule Take 1 capsule by mouth 3 times daily.    Automatic Reconciliation, Ar       Current medications:    No current facility-administered medications for this encounter.     Current Outpatient Medications   Medication Sig Dispense Refill   • doxycycline hyclate (VIBRAMYCIN) 100 MG capsule Take 1 capsule by mouth 2 times daily (Patient not taking: Reported on 2025)     • COLLAGEN PO Take by mouth daily POWDER     • cetirizine (ZYRTEC) 10 MG tablet Take 1 tablet by mouth daily     • BIOTIN PO Take 6,000 mg by mouth daily GUMMIES     • Multiple Vitamins-Minerals

## 2025-07-03 ENCOUNTER — HOSPITAL ENCOUNTER (OUTPATIENT)
Facility: HOSPITAL | Age: 70
Setting detail: OUTPATIENT SURGERY
Discharge: HOME | End: 2025-07-03
Attending: INTERNAL MEDICINE | Admitting: INTERNAL MEDICINE
Payer: MEDICARE

## 2025-07-03 ENCOUNTER — ANESTHESIA (OUTPATIENT)
Facility: HOSPITAL | Age: 70
End: 2025-07-03
Payer: MEDICARE

## 2025-07-03 VITALS
TEMPERATURE: 97.8 F | WEIGHT: 135.7 LBS | DIASTOLIC BLOOD PRESSURE: 61 MMHG | RESPIRATION RATE: 15 BRPM | BODY MASS INDEX: 24.97 KG/M2 | SYSTOLIC BLOOD PRESSURE: 143 MMHG | OXYGEN SATURATION: 100 % | HEIGHT: 62 IN | HEART RATE: 61 BPM

## 2025-07-03 PROCEDURE — 2709999900 HC NON-CHARGEABLE SUPPLY: Performed by: INTERNAL MEDICINE

## 2025-07-03 PROCEDURE — 3700000000 HC ANESTHESIA ATTENDED CARE: Performed by: INTERNAL MEDICINE

## 2025-07-03 PROCEDURE — 6360000002 HC RX W HCPCS

## 2025-07-03 PROCEDURE — 7100000010 HC PHASE II RECOVERY - FIRST 15 MIN: Performed by: INTERNAL MEDICINE

## 2025-07-03 PROCEDURE — 2580000003 HC RX 258: Performed by: INTERNAL MEDICINE

## 2025-07-03 PROCEDURE — 88305 TISSUE EXAM BY PATHOLOGIST: CPT

## 2025-07-03 PROCEDURE — 3700000001 HC ADD 15 MINUTES (ANESTHESIA): Performed by: INTERNAL MEDICINE

## 2025-07-03 PROCEDURE — 3600007512: Performed by: INTERNAL MEDICINE

## 2025-07-03 PROCEDURE — 7100000011 HC PHASE II RECOVERY - ADDTL 15 MIN: Performed by: INTERNAL MEDICINE

## 2025-07-03 PROCEDURE — 3600007502: Performed by: INTERNAL MEDICINE

## 2025-07-03 RX ORDER — SODIUM CHLORIDE 9 MG/ML
INJECTION, SOLUTION INTRAVENOUS PRN
Status: DISCONTINUED | OUTPATIENT
Start: 2025-07-03 | End: 2025-07-03 | Stop reason: HOSPADM

## 2025-07-03 RX ORDER — LIDOCAINE HYDROCHLORIDE 20 MG/ML
INJECTION, SOLUTION EPIDURAL; INFILTRATION; INTRACAUDAL; PERINEURAL
Status: DISCONTINUED | OUTPATIENT
Start: 2025-07-03 | End: 2025-07-03 | Stop reason: SDUPTHER

## 2025-07-03 RX ORDER — PHENYLEPHRINE HCL IN 0.9% NACL 0.4MG/10ML
SYRINGE (ML) INTRAVENOUS
Status: DISCONTINUED | OUTPATIENT
Start: 2025-07-03 | End: 2025-07-03 | Stop reason: SDUPTHER

## 2025-07-03 RX ORDER — SODIUM CHLORIDE 0.9 % (FLUSH) 0.9 %
5-40 SYRINGE (ML) INJECTION PRN
Status: DISCONTINUED | OUTPATIENT
Start: 2025-07-03 | End: 2025-07-03 | Stop reason: HOSPADM

## 2025-07-03 RX ORDER — SODIUM CHLORIDE 0.9 % (FLUSH) 0.9 %
5-40 SYRINGE (ML) INJECTION EVERY 12 HOURS SCHEDULED
Status: DISCONTINUED | OUTPATIENT
Start: 2025-07-03 | End: 2025-07-03 | Stop reason: HOSPADM

## 2025-07-03 RX ADMIN — Medication 100 MCG: at 08:22

## 2025-07-03 RX ADMIN — SODIUM CHLORIDE: 0.9 INJECTION, SOLUTION INTRAVENOUS at 07:55

## 2025-07-03 RX ADMIN — PROPOFOL 120 MG: 10 INJECTION, EMULSION INTRAVENOUS at 08:24

## 2025-07-03 RX ADMIN — Medication 100 MCG: at 08:20

## 2025-07-03 RX ADMIN — LIDOCAINE HYDROCHLORIDE 50 MG: 20 INJECTION, SOLUTION EPIDURAL; INFILTRATION; INTRACAUDAL; PERINEURAL at 08:13

## 2025-07-03 ASSESSMENT — PAIN - FUNCTIONAL ASSESSMENT: PAIN_FUNCTIONAL_ASSESSMENT: NONE - DENIES PAIN

## 2025-07-03 NOTE — PROGRESS NOTES
ARRIVAL INFORMATION:  Verified patient name and date of birth, scheduled procedure, and informed consent.     : Nataly Hernandez [phone] contact number:   Physician and staff can share information with the .     Receive texts: yes    Belongings with patient include:  Clothing,None    GI FOCUSED ASSESSMENT:  Neuro: Awake, alert, oriented x4  Respiratory: even and unlabored   GI: soft and non-distended  EKG Rhythm: normal sinus rhythm and PVC's    Education:Reviewed general discharge instructions and  information.

## 2025-07-03 NOTE — PROGRESS NOTES
After safety timeout and induction of anesthesia, PIV blew. Patient was mildly sedated- explained to aptient that new PIV must be started. Patient verbalized understanding. Patient has small bruise at infiltration site with mild oozing from skin at site where tape and PIV came off. Site cleansed and dressing applied to site.

## 2025-07-03 NOTE — ANESTHESIA POSTPROCEDURE EVALUATION
Department of Anesthesiology  Postprocedure Note    Patient: Kimmie Jamil  MRN: [id number]  YOB: 1955  Date of evaluation: 7/3/2025    Procedure Summary       Date: 07/03/25 Room / Location: [facility] ENDO 01 / [facility] ENDOSCOPY    Anesthesia Start: 0813 Anesthesia Stop: 0827    Procedure: COLONOSCOPY Diagnosis:       Special screening for malignant neoplasms, colon      First degree hemorrhoids      Colon ulcer      (Family history of colonic polyps [Z83.719])    Surgeons: Blas Vargas MD Responsible Provider: Arnulfo Groves MD    Anesthesia Type: MAC ASA Status: 3            Anesthesia Type: MAC    Cordell Phase I: Cordell Score: 10    Cordell Phase II: Cordell Score: 10    Anesthesia Post Evaluation    Patient location during evaluation: bedside  Patient participation: complete - patient participated  Level of consciousness: awake  Pain score: 0  Airway patency: patent  Nausea & Vomiting: no nausea and no vomiting  Cardiovascular status: hemodynamically stable  Respiratory status: acceptable  Hydration status: euvolemic  Pain management: adequate    No notable events documented.

## 2025-07-03 NOTE — H&P
Gastroenterology Outpatient History and Physical    Patient: Kimmie Jamil    Physician: Blas Vargas MD    Chief Complaint: CRC screening  History of Present Illness: 71yo F with need for CRC screening.  Last colonoscopy 6/2015.    History:  Past Medical History:   Diagnosis Date    Anxiety     Arthritis     shoulders, knees, back, neck    Cancer (HCC)     basal on face    H/O seasonal allergies     Nausea     w/ anxiety      Past Surgical History:   Procedure Laterality Date    APPENDECTOMY  age 10    CATARACT REMOVAL WITH IMPLANT Bilateral     COLONOSCOPY      DILATION AND CURETTAGE OF UTERUS      HAMMER TOE SURGERY Left 04/05/2024    CORRECTION OF ANGULAR TOE DEFORMITY TO LEFT GREAT AND SECOND TOE performed by Foreign Oates DPM at \[facility]\"" MAIN OR    HAMMER TOE SURGERY Right 12/20/2024    RESECTION DISTAL END OF PHALANX OF RIGHT GREAT TOE AND RECONSTRUCTION ANGULAR DEFORMITY OF RIGHT SECOND TOE SOFT TISSUE  PROCEDURES ONLY (MAC AND LOCAL) performed by Foreign Oates DPM at \[facility]\"" MAIN OR    TOOTH EXTRACTION  2025    TOTAL KNEE ARTHROPLASTY Right 10/25/2023      Social History     Socioeconomic History    Marital status:      Spouse name: None    Number of children: None    Years of education: None    Highest education level: None   Tobacco Use    Smoking status: Every Day     Current packs/day: 0.50     Average packs/day: 0.5 packs/day for 56.5 years (28.3 ttl pk-yrs)     Types: Cigarettes     Start date: 1969    Smokeless tobacco: Never   Vaping Use    Vaping status: Never Used   Substance and Sexual Activity    Alcohol use: Yes     Comment: socially    Drug use: Not Currently      Family History   Problem Relation Age of Onset    Stroke Mother     Pneumonia Father       Patient Active Problem List   Diagnosis    Chronic foot ulcer with fat layer exposed, left (HCC)    Pressure ulcer of right foot, stage 2 (HCC)       Allergies: No Known Allergies  Medications:   Prior to Admission medications

## 2025-07-03 NOTE — DISCHARGE INSTRUCTIONS
Kimmie Iqbal [place]  [id number]  1955    COLON DISCHARGE INSTRUCTIONS    CALL M.D.  ANY SIGN OF:   Increasing pain, nausea, vomiting  Abdominal distension (swelling)  New increased bleeding (oral or rectal)  Fever (chills)  Pain in chest area  Bloody discharge from nose or mouth  Shortness of breath    For 24 hours after general anesthesia or intravenous analgesia / sedation  you may experience:  Drowsiness, dizziness, sleepiness, or confusion  Difficulty remembering or delayed reaction times  Difficulty with your balance, especially while walking, move slowly and carefully, do not make sudden position changes  Difficulty focusing or blurred vision    Discomfort:  Redness at IV site- apply warm compress to area; if redness or soreness persist- contact your physician  There may be a slight amount of blood passed from the rectum  Gaseous discomfort- walking, belching will help relieve any discomfort    DIET:   High fiber diet.   - however -  remember your colon is empty and a heavy meal will produce gas.   Avoid these foods:  vegetables, fried / greasy foods, carbonated drinks for today    MEDICATION:  [unfilled]     ACTIVITY:  You may not resume your normal daily activities until tomorrow AM; it is recommended that you spend the remainder of the day resting -  avoid any strenuous activity.  You may not operate a vehicle for 12 hours  You may not engage in an occupation involving machinery or appliances for rest of today  You may not drink alcoholic beverages for at least 12 hours  Avoid making any critical decisions for at least 24 hour    IMPRESSION:  -Normal terminal ileum  -Single small shallow ileocecal valve ulcer with benign appearance; biopsied to evaluate for inflammation  -Small grade 1 internal hemorrhoids  -No colon masses or polyps    Follow-up Instructions:   Call Dr. Vargas for the results of procedure / biopsy in 7-10 days  Telephone # [phone]  Repeat colonoscopy in 10 years    Blas Vargas

## 2025-07-03 NOTE — OP NOTE
NAME:  Kimmie Jamil   :   1955   MRN:   [id number]     Date/Time:  7/3/2025 8:32 AM    Colonoscopy Operative Report    Procedure Type:   Colonoscopy with biopsy     Indications:     Screening colonoscopy  Pre-operative Diagnosis: see indication above  Post-operative Diagnosis:  See findings below  :  Blas Vargas MD  Referring Provider: -Socorro Sherman MD    Exam:  Airway: clear, no airway problems anticipated  Heart: RRR, without gallops or rubs  Lungs: clear bilaterally without wheezes, crackles, or rhonchi  Abdomen: soft, nontender, nondistended, bowel sounds present  Mental Status: awake, alert and oriented to person, place and time    Sedation:  MAC anesthesia Propofol 120mg IV  Procedure Details:  After informed consent was obtained with all risks and benefits of procedure explained and preoperative exam completed, the patient was taken to the endoscopy suite and placed in the left lateral decubitus position.  Upon sequential sedation as per above, a digital rectal exam was performed demonstrating internal hemorrhoids.  The Olympus videocolonoscope  was inserted in the rectum and carefully advanced to the cecum, which was identified by the ileocecal valve and appendiceal orifice.  The distal terminal ileum was evaluated.  The quality of preparation was adequate.  The colonoscope was slowly withdrawn with careful evaluation between folds. Retroflexion in the rectum was completed demonstrating internal hemorrhoids.     Findings:     -Normal terminal ileum  -Single small shallow ileocecal valve ulcer with benign appearance; biopsied to evaluate for inflammation  -Small grade 1 internal hemorrhoids  -No colon masses or polyps    Specimen Removed:  #1 ileocecal valve ulcer  Complications: None.   EBL:  None.    Impression:    -Normal terminal ileum  -Single small shallow ileocecal valve ulcer with benign appearance; biopsied to evaluate for inflammation  -Small grade 1 internal

## (undated) DEVICE — STERILE POLYISOPRENE POWDER-FREE SURGICAL GLOVES: Brand: PROTEXIS

## (undated) DEVICE — NDL SPNE QNCKE 25GX3.5IN LF --

## (undated) DEVICE — ZIMMER® STERILE DISPOSABLE TOURNIQUET CUFF WITH PLC, DUAL PORT, SINGLE BLADDER, 18 IN. (46 CM)

## (undated) DEVICE — GLOVE ORANGE PI 7 1/2   MSG9075

## (undated) DEVICE — NEEDLE HYPO 18GA L1.5IN PNK S STL HUB POLYPR SHLD REG BVL

## (undated) DEVICE — FORCEPS BX 240CM 2.4MM L NDL RAD JAW 4 M00513334

## (undated) DEVICE — STRAP,POSITIONING,KNEE/BODY,FOAM,4X60": Brand: MEDLINE

## (undated) DEVICE — (D)PREP SKN CHLRAPRP APPL 26ML -- CONVERT TO ITEM 371833

## (undated) DEVICE — BANDAGE,GAUZE,BULKEE II,4.5"X4.1YD,STRL: Brand: MEDLINE

## (undated) DEVICE — NEEDLE SPNL L4.75IN OD25GA QNCKE TYP SPINOCAN

## (undated) DEVICE — SUTURE PROL SZ 3-0 L18IN NONABSORBABLE BLU L19MM PS-2 3/8 8687H

## (undated) DEVICE — NEEDLE HYPO 25GA L1.5IN BVL ORIENTED ECLIPSE

## (undated) DEVICE — SYR 10ML LUER LOK 1/5ML GRAD --

## (undated) DEVICE — SOLUTION IV 1000ML 0.9% SOD CHL

## (undated) DEVICE — EXTREMITY III-LF: Brand: MEDLINE INDUSTRIES, INC.

## (undated) DEVICE — SYR 5ML 1/5 GRAD LL NSAF LF --

## (undated) DEVICE — POLYLINED TOWEL: Brand: CONVERTORS

## (undated) DEVICE — SET EXTN PRIMING 0.59ML 8.5IN 1.55LB PRSS RATE MINIBOR PUR

## (undated) DEVICE — GOWN,SIRUS,NONRNF,SETINSLV,XL,20/CS: Brand: MEDLINE

## (undated) DEVICE — SUTURE PROL SZ 3-0 L30IN NONABSORBABLE BLU L26MM SH 1/2 CIR 8832H

## (undated) DEVICE — TOWEL SURG W17XL27IN STD BLU COT NONFENESTRATED PREWASHED

## (undated) DEVICE — EXTREMITY-MRMC: Brand: MEDLINE INDUSTRIES, INC.

## (undated) DEVICE — COVER LT HNDL PLAS RIG 1 PER PK

## (undated) DEVICE — ADULT SPO2 SENSOR: Brand: NELLCOR

## (undated) DEVICE — SUTURE VCRL SZ 4-0 L27IN ABSRB UD L19MM PS-2 3/8 CIR PRIM J426H

## (undated) DEVICE — MARKER,SKIN,WI/RULER AND LABELS: Brand: MEDLINE

## (undated) DEVICE — DRAPE,U/ SHT,SPLIT,PLAS,STERIL: Brand: MEDLINE

## (undated) DEVICE — IV START KIT: Brand: MEDLINE

## (undated) DEVICE — APPLICATOR MEDICATED 26 CC SOLUTION HI LT ORNG CHLORAPREP

## (undated) DEVICE — INFECTION CONTROL KIT SYS

## (undated) DEVICE — SOLUTION IRRIG 500ML 0.9% SOD CHLO USP POUR PLAS BTL

## (undated) DEVICE — ROCKER SWITCH PENCIL BLADE ELECTRODE, HOLSTER: Brand: EDGE

## (undated) DEVICE — BANDAGE COBAN 4 IN COMPR W4INXL5YD FOAM COHESIVE QUIK STK SELF ADH SFT

## (undated) DEVICE — ENDOSCOPIC KIT COMPLIANCE ENDOKIT

## (undated) DEVICE — SUTURE MONOCRYL SZ 3-0 L27IN ABSRB UD L19MM PS-2 3/8 CIR PRIM Y427H

## (undated) DEVICE — Device

## (undated) DEVICE — SUTURE ETHLN SZ 4-0 L18IN NONABSORBABLE BLK L19MM PS-2 3/8 1667H

## (undated) DEVICE — CUFF BLD PRSS AD CLTH SGL TB W/ BAYNT CONN ROUNDED CORNER